# Patient Record
Sex: MALE | Race: WHITE | Employment: OTHER | URBAN - METROPOLITAN AREA
[De-identification: names, ages, dates, MRNs, and addresses within clinical notes are randomized per-mention and may not be internally consistent; named-entity substitution may affect disease eponyms.]

---

## 2023-09-11 ENCOUNTER — HOSPITAL ENCOUNTER (OUTPATIENT)
Facility: HOSPITAL | Age: 63
Setting detail: OBSERVATION
Discharge: HOME | DRG: 312 | End: 2023-09-12
Attending: EMERGENCY MEDICINE | Admitting: HOSPITALIST
Payer: COMMERCIAL

## 2023-09-11 ENCOUNTER — HOSPITAL ENCOUNTER (INPATIENT)
Facility: HOSPITAL | Age: 63
LOS: 1 days | Discharge: HOME | End: 2023-09-12
Attending: EMERGENCY MEDICINE | Admitting: HOSPITALIST
Payer: COMMERCIAL

## 2023-09-11 ENCOUNTER — APPOINTMENT (OUTPATIENT)
Dept: CT IMAGING | Facility: HOSPITAL | Age: 63
DRG: 312 | End: 2023-09-11
Attending: EMERGENCY MEDICINE
Payer: COMMERCIAL

## 2023-09-11 ENCOUNTER — APPOINTMENT (OUTPATIENT)
Dept: CT IMAGING | Facility: HOSPITAL | Age: 63
End: 2023-09-11
Attending: EMERGENCY MEDICINE
Payer: COMMERCIAL

## 2023-09-11 DIAGNOSIS — R41.82 ALTERED MENTAL STATUS, UNSPECIFIED ALTERED MENTAL STATUS TYPE: ICD-10-CM

## 2023-09-11 DIAGNOSIS — R55 SYNCOPE AND COLLAPSE: Primary | ICD-10-CM

## 2023-09-11 PROBLEM — E66.9 DIABETES MELLITUS TYPE 2 IN OBESE: Status: ACTIVE | Noted: 2023-09-11

## 2023-09-11 PROBLEM — I10 PRIMARY HYPERTENSION: Status: ACTIVE | Noted: 2023-09-11

## 2023-09-11 PROBLEM — R94.31 ABNORMAL EKG: Status: ACTIVE | Noted: 2023-09-11

## 2023-09-11 PROBLEM — E11.69 DIABETES MELLITUS TYPE 2 IN OBESE: Status: ACTIVE | Noted: 2023-09-11

## 2023-09-11 LAB
ALBUMIN SERPL-MCNC: 3.9 G/DL (ref 3.4–5)
ALBUMIN/GLOB SERPL: 1.2 {RATIO} (ref 1–2)
ALP LIVER SERPL-CCNC: 56 U/L
ALT SERPL-CCNC: 117 U/L
ANION GAP SERPL CALC-SCNC: 11 MMOL/L (ref 0–18)
AST SERPL-CCNC: 66 U/L (ref 15–37)
ATRIAL RATE: 80 BPM
BASOPHILS # BLD AUTO: 0.06 X10(3) UL (ref 0–0.2)
BASOPHILS NFR BLD AUTO: 0.8 %
BILIRUB SERPL-MCNC: 0.7 MG/DL (ref 0.1–2)
BUN BLD-MCNC: 19 MG/DL (ref 7–18)
CALCIUM BLD-MCNC: 9.5 MG/DL (ref 8.5–10.1)
CHLORIDE SERPL-SCNC: 105 MMOL/L (ref 98–112)
CO2 SERPL-SCNC: 20 MMOL/L (ref 21–32)
CREAT BLD-MCNC: 1.3 MG/DL
EGFRCR SERPLBLD CKD-EPI 2021: 62 ML/MIN/1.73M2 (ref 60–?)
EOSINOPHIL # BLD AUTO: 0.09 X10(3) UL (ref 0–0.7)
EOSINOPHIL NFR BLD AUTO: 1.1 %
ERYTHROCYTE [DISTWIDTH] IN BLOOD BY AUTOMATED COUNT: 13.8 %
GLOBULIN PLAS-MCNC: 3.3 G/DL (ref 2.8–4.4)
GLUCOSE BLD-MCNC: 125 MG/DL (ref 70–99)
GLUCOSE BLD-MCNC: 134 MG/DL (ref 70–99)
GLUCOSE BLD-MCNC: 143 MG/DL (ref 70–99)
GLUCOSE BLD-MCNC: 158 MG/DL (ref 70–99)
HCT VFR BLD AUTO: 45.1 %
HGB BLD-MCNC: 15.3 G/DL
IMM GRANULOCYTES # BLD AUTO: 0.07 X10(3) UL (ref 0–1)
IMM GRANULOCYTES NFR BLD: 0.9 %
LYMPHOCYTES # BLD AUTO: 2.22 X10(3) UL (ref 1–4)
LYMPHOCYTES NFR BLD AUTO: 27.8 %
MCH RBC QN AUTO: 28.8 PG (ref 26–34)
MCHC RBC AUTO-ENTMCNC: 33.9 G/DL (ref 31–37)
MCV RBC AUTO: 84.9 FL
MONOCYTES # BLD AUTO: 0.96 X10(3) UL (ref 0.1–1)
MONOCYTES NFR BLD AUTO: 12 %
NEUTROPHILS # BLD AUTO: 4.58 X10 (3) UL (ref 1.5–7.7)
NEUTROPHILS # BLD AUTO: 4.58 X10(3) UL (ref 1.5–7.7)
NEUTROPHILS NFR BLD AUTO: 57.4 %
OSMOLALITY SERPL CALC.SUM OF ELEC: 287 MOSM/KG (ref 275–295)
P AXIS: 32 DEGREES
P-R INTERVAL: 238 MS
PLATELET # BLD AUTO: 248 10(3)UL (ref 150–450)
POTASSIUM SERPL-SCNC: 3.4 MMOL/L (ref 3.5–5.1)
PROT SERPL-MCNC: 7.2 G/DL (ref 6.4–8.2)
Q-T INTERVAL: 434 MS
QRS DURATION: 148 MS
QTC CALCULATION (BEZET): 500 MS
R AXIS: -54 DEGREES
RBC # BLD AUTO: 5.31 X10(6)UL
SODIUM SERPL-SCNC: 136 MMOL/L (ref 136–145)
T AXIS: 104 DEGREES
TROPONIN I HIGH SENSITIVITY: 14 NG/L
VENTRICULAR RATE: 80 BPM
WBC # BLD AUTO: 8 X10(3) UL (ref 4–11)

## 2023-09-11 PROCEDURE — 70450 CT HEAD/BRAIN W/O DYE: CPT | Performed by: EMERGENCY MEDICINE

## 2023-09-11 PROCEDURE — 99223 1ST HOSP IP/OBS HIGH 75: CPT | Performed by: HOSPITALIST

## 2023-09-11 RX ORDER — PROCHLORPERAZINE EDISYLATE 5 MG/ML
5 INJECTION INTRAMUSCULAR; INTRAVENOUS EVERY 8 HOURS PRN
Status: DISCONTINUED | OUTPATIENT
Start: 2023-09-11 | End: 2023-09-12

## 2023-09-11 RX ORDER — ACYCLOVIR 400 MG/1
1 TABLET ORAL
COMMUNITY
Start: 2023-09-05

## 2023-09-11 RX ORDER — NEOMYCIN/POLYMYXIN B/PRAMOXINE 3.5-10K-1
1000 CREAM (GRAM) TOPICAL DAILY
COMMUNITY

## 2023-09-11 RX ORDER — POTASSIUM CHLORIDE 1.5 G/1.58G
40 POWDER, FOR SOLUTION ORAL EVERY 4 HOURS
Status: COMPLETED | OUTPATIENT
Start: 2023-09-11 | End: 2023-09-11

## 2023-09-11 RX ORDER — HYDROCODONE BITARTRATE AND ACETAMINOPHEN 5; 325 MG/1; MG/1
2 TABLET ORAL EVERY 4 HOURS PRN
Status: DISCONTINUED | OUTPATIENT
Start: 2023-09-11 | End: 2023-09-12

## 2023-09-11 RX ORDER — SODIUM CHLORIDE 9 MG/ML
INJECTION, SOLUTION INTRAVENOUS CONTINUOUS
Status: ACTIVE | OUTPATIENT
Start: 2023-09-11 | End: 2023-09-12

## 2023-09-11 RX ORDER — DEXTROSE MONOHYDRATE 25 G/50ML
50 INJECTION, SOLUTION INTRAVENOUS
Status: DISCONTINUED | OUTPATIENT
Start: 2023-09-11 | End: 2023-09-12

## 2023-09-11 RX ORDER — ACYCLOVIR 400 MG/1
1 TABLET ORAL AS DIRECTED
COMMUNITY
Start: 2023-09-06

## 2023-09-11 RX ORDER — POLYETHYLENE GLYCOL 3350 17 G/17G
17 POWDER, FOR SOLUTION ORAL DAILY PRN
Status: DISCONTINUED | OUTPATIENT
Start: 2023-09-11 | End: 2023-09-12

## 2023-09-11 RX ORDER — ONDANSETRON 2 MG/ML
4 INJECTION INTRAMUSCULAR; INTRAVENOUS EVERY 4 HOURS PRN
Status: DISCONTINUED | OUTPATIENT
Start: 2023-09-11 | End: 2023-09-11 | Stop reason: ALTCHOICE

## 2023-09-11 RX ORDER — ONDANSETRON 2 MG/ML
INJECTION INTRAMUSCULAR; INTRAVENOUS
Status: COMPLETED
Start: 2023-09-11 | End: 2023-09-11

## 2023-09-11 RX ORDER — LISINOPRIL 10 MG/1
10 TABLET ORAL DAILY
Status: DISCONTINUED | OUTPATIENT
Start: 2023-09-12 | End: 2023-09-12

## 2023-09-11 RX ORDER — ENEMA 19; 7 G/133ML; G/133ML
1 ENEMA RECTAL ONCE AS NEEDED
Status: DISCONTINUED | OUTPATIENT
Start: 2023-09-11 | End: 2023-09-12

## 2023-09-11 RX ORDER — LISINOPRIL 10 MG/1
1 TABLET ORAL DAILY
COMMUNITY
Start: 2020-10-08

## 2023-09-11 RX ORDER — OMEPRAZOLE 20 MG/1
20 CAPSULE, DELAYED RELEASE ORAL DAILY
COMMUNITY

## 2023-09-11 RX ORDER — MELATONIN
3 NIGHTLY PRN
Status: DISCONTINUED | OUTPATIENT
Start: 2023-09-11 | End: 2023-09-12

## 2023-09-11 RX ORDER — ROSUVASTATIN CALCIUM 10 MG/1
10 TABLET, COATED ORAL DAILY
Status: DISCONTINUED | OUTPATIENT
Start: 2023-09-12 | End: 2023-09-12

## 2023-09-11 RX ORDER — ENOXAPARIN SODIUM 100 MG/ML
40 INJECTION SUBCUTANEOUS DAILY
Status: DISCONTINUED | OUTPATIENT
Start: 2023-09-11 | End: 2023-09-12

## 2023-09-11 RX ORDER — HYDROCODONE BITARTRATE AND ACETAMINOPHEN 5; 325 MG/1; MG/1
1 TABLET ORAL EVERY 4 HOURS PRN
Status: DISCONTINUED | OUTPATIENT
Start: 2023-09-11 | End: 2023-09-12

## 2023-09-11 RX ORDER — ALPRAZOLAM 0.25 MG/1
0.25 TABLET ORAL 4 TIMES DAILY PRN
Status: DISCONTINUED | OUTPATIENT
Start: 2023-09-11 | End: 2023-09-12

## 2023-09-11 RX ORDER — ONDANSETRON 2 MG/ML
4 INJECTION INTRAMUSCULAR; INTRAVENOUS ONCE
Status: COMPLETED | OUTPATIENT
Start: 2023-09-11 | End: 2023-09-11

## 2023-09-11 RX ORDER — SENNOSIDES 8.6 MG
17.2 TABLET ORAL NIGHTLY PRN
Status: DISCONTINUED | OUTPATIENT
Start: 2023-09-11 | End: 2023-09-12

## 2023-09-11 RX ORDER — PANTOPRAZOLE SODIUM 20 MG/1
20 TABLET, DELAYED RELEASE ORAL
Status: DISCONTINUED | OUTPATIENT
Start: 2023-09-12 | End: 2023-09-12

## 2023-09-11 RX ORDER — ONDANSETRON 2 MG/ML
4 INJECTION INTRAMUSCULAR; INTRAVENOUS EVERY 6 HOURS PRN
Status: DISCONTINUED | OUTPATIENT
Start: 2023-09-11 | End: 2023-09-11

## 2023-09-11 RX ORDER — ACETAMINOPHEN 325 MG/1
650 TABLET ORAL EVERY 4 HOURS PRN
Status: DISCONTINUED | OUTPATIENT
Start: 2023-09-11 | End: 2023-09-12

## 2023-09-11 RX ORDER — NICOTINE POLACRILEX 4 MG
15 LOZENGE BUCCAL
Status: DISCONTINUED | OUTPATIENT
Start: 2023-09-11 | End: 2023-09-12

## 2023-09-11 RX ORDER — BISACODYL 10 MG
10 SUPPOSITORY, RECTAL RECTAL
Status: DISCONTINUED | OUTPATIENT
Start: 2023-09-11 | End: 2023-09-12

## 2023-09-11 RX ORDER — NICOTINE POLACRILEX 4 MG
30 LOZENGE BUCCAL
Status: DISCONTINUED | OUTPATIENT
Start: 2023-09-11 | End: 2023-09-12

## 2023-09-11 RX ORDER — ASPIRIN 81 MG/1
81 TABLET ORAL DAILY
Status: DISCONTINUED | OUTPATIENT
Start: 2023-09-11 | End: 2023-09-12

## 2023-09-11 NOTE — ED QUICK NOTES
Patients wife Will Tom called and provided an update on POC. All questions answered. Will Tom was informed he is going to be admitted for further evaluation. Will Tom asked if she can be updated with admission information. All question answered.

## 2023-09-11 NOTE — ED QUICK NOTES
Patient back from CT and placed on monitor. States the Zofran helped but he's still feeling lightheaded/dizzy. Vitals remain stable. Will continue to monitor.

## 2023-09-11 NOTE — ED QUICK NOTES
This RN and another RN responded to the room with low oxygen. Patient was dozing off. SpO2 improved to 96%. ED MD at bedside.

## 2023-09-11 NOTE — ED INITIAL ASSESSMENT (HPI)
Patient arrived to the ED from the 5th floor s/p syncopal episode. Hospital staff states patient was found collapsed outside of the elevators. Patient does not remember falling. Hospital staff placed on backboard and C-Collar. Patient is diabetic, states he ate today. Very diaphoretic and nauseous at this time. AAOx3 and denies pain. EKG completed.

## 2023-09-12 ENCOUNTER — APPOINTMENT (OUTPATIENT)
Dept: CV DIAGNOSTICS | Facility: HOSPITAL | Age: 63
DRG: 312 | End: 2023-09-12
Attending: HOSPITALIST
Payer: COMMERCIAL

## 2023-09-12 ENCOUNTER — APPOINTMENT (OUTPATIENT)
Dept: CV DIAGNOSTICS | Facility: HOSPITAL | Age: 63
End: 2023-09-12
Attending: HOSPITALIST
Payer: COMMERCIAL

## 2023-09-12 VITALS
RESPIRATION RATE: 22 BRPM | WEIGHT: 250 LBS | HEART RATE: 52 BPM | TEMPERATURE: 98 F | HEIGHT: 70 IN | BODY MASS INDEX: 35.79 KG/M2 | OXYGEN SATURATION: 98 % | DIASTOLIC BLOOD PRESSURE: 62 MMHG | SYSTOLIC BLOOD PRESSURE: 114 MMHG

## 2023-09-12 LAB
EST. AVERAGE GLUCOSE BLD GHB EST-MCNC: 255 MG/DL (ref 68–126)
GLUCOSE BLD-MCNC: 104 MG/DL (ref 70–99)
GLUCOSE BLD-MCNC: 116 MG/DL (ref 70–99)
GLUCOSE BLD-MCNC: 133 MG/DL (ref 70–99)
HBA1C MFR BLD: 10.5 % (ref ?–5.7)
POTASSIUM SERPL-SCNC: 4.4 MMOL/L (ref 3.5–5.1)

## 2023-09-12 PROCEDURE — 93306 TTE W/DOPPLER COMPLETE: CPT | Performed by: HOSPITALIST

## 2023-09-12 PROCEDURE — 99239 HOSP IP/OBS DSCHRG MGMT >30: CPT | Performed by: STUDENT IN AN ORGANIZED HEALTH CARE EDUCATION/TRAINING PROGRAM

## 2023-09-12 NOTE — PLAN OF CARE
Assumed care of pt around 1900. A/o x4. RA. NSR/SB w/ 1st deg AVB on tele. C/o lightheadedness/dizziness when standing/ambulating. Intermittent nausea. PRN compazine ordered. IVF started. Orthostatics (-). L side of head tender/swollen. PRN tylenol & Norco given. Plan for echo in the AM.  Spouse Ollie Peterson updated over the phone. Currently resting in bed w/ call light within reach & safety precautions in place.

## 2023-09-12 NOTE — PROGRESS NOTES
Orthostatics (-)   09/11/23 1951 09/11/23 1954 09/11/23 1955   Vital Signs   /62 138/67 138/73   MAP (mmHg) 80 85 89   BP Location Left arm Left arm Left arm   BP Method Automatic Automatic Automatic   Patient Position Lying Sitting Standing

## 2023-09-12 NOTE — PLAN OF CARE
Assumed care at 0700. Pt A/O x4. RA. NSR/SB on tele. VSS. Echo completed. All consults cleared for discharge. Pt family at the bedside. Discharge instructions reviewed with pt  And pt family. IV removed. Tele removed. Pt discharged home via wheel chair.

## 2023-09-15 ENCOUNTER — PATIENT OUTREACH (OUTPATIENT)
Dept: CASE MANAGEMENT | Age: 63
End: 2023-09-15

## 2023-09-15 NOTE — PROGRESS NOTES
Hospital follow up, first attempt. (Dc 9/12)    Follow up with [facility] for DM. No answer, left a voicemail with callback information.

## 2023-09-19 NOTE — PROGRESS NOTES
Hospital follow up, second attempt. (Dc 9/12)    Follow up with [facility] for DM. No answer, left a voicemail with callback information.

## 2023-09-20 NOTE — PROGRESS NOTES
Hospital follow up, final attempt. (Dc 9/12)    Follow up with [facility] for DM. No answer, left a voicemail with callback information. Closing encounter.

## 2024-05-04 ENCOUNTER — HOSPITAL ENCOUNTER (INPATIENT)
Facility: HOSPITAL | Age: 64
LOS: 3 days | Discharge: HOME HEALTH | DRG: 243 | End: 2024-05-07
Attending: EMERGENCY MEDICINE | Admitting: INTERNAL MEDICINE
Payer: COMMERCIAL

## 2024-05-04 ENCOUNTER — APPOINTMENT (OUTPATIENT)
Dept: CT IMAGING | Facility: HOSPITAL | Age: 64
DRG: 243 | End: 2024-05-04
Attending: EMERGENCY MEDICINE
Payer: COMMERCIAL

## 2024-05-04 ENCOUNTER — APPOINTMENT (OUTPATIENT)
Dept: CV DIAGNOSTICS | Facility: HOSPITAL | Age: 64
DRG: 243 | End: 2024-05-04
Attending: INTERNAL MEDICINE
Payer: COMMERCIAL

## 2024-05-04 ENCOUNTER — APPOINTMENT (OUTPATIENT)
Dept: GENERAL RADIOLOGY | Facility: HOSPITAL | Age: 64
DRG: 243 | End: 2024-05-04
Attending: EMERGENCY MEDICINE
Payer: COMMERCIAL

## 2024-05-04 DIAGNOSIS — R00.1 BRADYCARDIA: ICD-10-CM

## 2024-05-04 DIAGNOSIS — S32.10XA CLOSED FRACTURE OF SACRUM AND COCCYX, INITIAL ENCOUNTER (HCC): ICD-10-CM

## 2024-05-04 DIAGNOSIS — S09.90XA INJURY OF HEAD, INITIAL ENCOUNTER: ICD-10-CM

## 2024-05-04 DIAGNOSIS — R55 SYNCOPE, UNSPECIFIED SYNCOPE TYPE: Primary | ICD-10-CM

## 2024-05-04 DIAGNOSIS — R91.8 PULMONARY INFILTRATE: ICD-10-CM

## 2024-05-04 DIAGNOSIS — S32.2XXA CLOSED FRACTURE OF SACRUM AND COCCYX, INITIAL ENCOUNTER (HCC): ICD-10-CM

## 2024-05-04 PROBLEM — R73.9 HYPERGLYCEMIA: Status: ACTIVE | Noted: 2024-05-04

## 2024-05-04 LAB
ALBUMIN SERPL-MCNC: 3.6 G/DL (ref 3.4–5)
ALBUMIN/GLOB SERPL: 1.1 {RATIO} (ref 1–2)
ALP LIVER SERPL-CCNC: 56 U/L
ALT SERPL-CCNC: 60 U/L
ANION GAP SERPL CALC-SCNC: 6 MMOL/L (ref 0–18)
AST SERPL-CCNC: 19 U/L (ref 15–37)
ATRIAL RATE: 70 BPM
BASOPHILS # BLD AUTO: 0.04 X10(3) UL (ref 0–0.2)
BASOPHILS NFR BLD AUTO: 0.8 %
BILIRUB SERPL-MCNC: 0.9 MG/DL (ref 0.1–2)
BUN BLD-MCNC: 20 MG/DL (ref 9–23)
CALCIUM BLD-MCNC: 9.2 MG/DL (ref 8.5–10.1)
CHLORIDE SERPL-SCNC: 107 MMOL/L (ref 98–112)
CO2 SERPL-SCNC: 26 MMOL/L (ref 21–32)
CREAT BLD-MCNC: 1.09 MG/DL
EGFRCR SERPLBLD CKD-EPI 2021: 76 ML/MIN/1.73M2 (ref 60–?)
EOSINOPHIL # BLD AUTO: 0.08 X10(3) UL (ref 0–0.7)
EOSINOPHIL NFR BLD AUTO: 1.5 %
ERYTHROCYTE [DISTWIDTH] IN BLOOD BY AUTOMATED COUNT: 14 %
EST. AVERAGE GLUCOSE BLD GHB EST-MCNC: 154 MG/DL (ref 68–126)
FLUAV + FLUBV RNA SPEC NAA+PROBE: NEGATIVE
FLUAV + FLUBV RNA SPEC NAA+PROBE: NEGATIVE
GLOBULIN PLAS-MCNC: 3.3 G/DL (ref 2.8–4.4)
GLUCOSE BLD-MCNC: 103 MG/DL (ref 70–99)
GLUCOSE BLD-MCNC: 111 MG/DL (ref 70–99)
GLUCOSE BLD-MCNC: 156 MG/DL (ref 70–99)
GLUCOSE BLD-MCNC: 85 MG/DL (ref 70–99)
HBA1C MFR BLD: 7 % (ref ?–5.7)
HCT VFR BLD AUTO: 44 %
HGB BLD-MCNC: 14.8 G/DL
IMM GRANULOCYTES # BLD AUTO: 0.02 X10(3) UL (ref 0–1)
IMM GRANULOCYTES NFR BLD: 0.4 %
LYMPHOCYTES # BLD AUTO: 0.94 X10(3) UL (ref 1–4)
LYMPHOCYTES NFR BLD AUTO: 18.1 %
MCH RBC QN AUTO: 28.1 PG (ref 26–34)
MCHC RBC AUTO-ENTMCNC: 33.6 G/DL (ref 31–37)
MCV RBC AUTO: 83.7 FL
MONOCYTES # BLD AUTO: 0.57 X10(3) UL (ref 0.1–1)
MONOCYTES NFR BLD AUTO: 11 %
NEUTROPHILS # BLD AUTO: 3.53 X10 (3) UL (ref 1.5–7.7)
NEUTROPHILS # BLD AUTO: 3.53 X10(3) UL (ref 1.5–7.7)
NEUTROPHILS NFR BLD AUTO: 68.2 %
OSMOLALITY SERPL CALC.SUM OF ELEC: 294 MOSM/KG (ref 275–295)
P AXIS: 8 DEGREES
P-R INTERVAL: 248 MS
PLATELET # BLD AUTO: 205 10(3)UL (ref 150–450)
POTASSIUM SERPL-SCNC: 3.6 MMOL/L (ref 3.5–5.1)
POTASSIUM SERPL-SCNC: 4 MMOL/L (ref 3.5–5.1)
PROCALCITONIN SERPL-MCNC: <0.05 NG/ML (ref ?–0.16)
PROT SERPL-MCNC: 6.9 G/DL (ref 6.4–8.2)
Q-T INTERVAL: 434 MS
QRS DURATION: 148 MS
QTC CALCULATION (BEZET): 468 MS
R AXIS: -56 DEGREES
RBC # BLD AUTO: 5.26 X10(6)UL
RSV RNA SPEC NAA+PROBE: NEGATIVE
SARS-COV-2 RNA RESP QL NAA+PROBE: NOT DETECTED
SODIUM SERPL-SCNC: 139 MMOL/L (ref 136–145)
T AXIS: 96 DEGREES
TROPONIN I SERPL HS-MCNC: 10 NG/L
VENTRICULAR RATE: 70 BPM
WBC # BLD AUTO: 5.2 X10(3) UL (ref 4–11)

## 2024-05-04 PROCEDURE — 93306 TTE W/DOPPLER COMPLETE: CPT | Performed by: INTERNAL MEDICINE

## 2024-05-04 PROCEDURE — 72220 X-RAY EXAM SACRUM TAILBONE: CPT | Performed by: EMERGENCY MEDICINE

## 2024-05-04 PROCEDURE — 71045 X-RAY EXAM CHEST 1 VIEW: CPT | Performed by: EMERGENCY MEDICINE

## 2024-05-04 PROCEDURE — 70486 CT MAXILLOFACIAL W/O DYE: CPT | Performed by: EMERGENCY MEDICINE

## 2024-05-04 PROCEDURE — 72125 CT NECK SPINE W/O DYE: CPT | Performed by: EMERGENCY MEDICINE

## 2024-05-04 PROCEDURE — 70450 CT HEAD/BRAIN W/O DYE: CPT | Performed by: EMERGENCY MEDICINE

## 2024-05-04 PROCEDURE — 99223 1ST HOSP IP/OBS HIGH 75: CPT | Performed by: INTERNAL MEDICINE

## 2024-05-04 RX ORDER — NICOTINE POLACRILEX 4 MG
30 LOZENGE BUCCAL
Status: DISCONTINUED | OUTPATIENT
Start: 2024-05-04 | End: 2024-05-07

## 2024-05-04 RX ORDER — NICOTINE POLACRILEX 4 MG
15 LOZENGE BUCCAL
Status: DISCONTINUED | OUTPATIENT
Start: 2024-05-04 | End: 2024-05-07

## 2024-05-04 RX ORDER — LISINOPRIL 10 MG/1
10 TABLET ORAL DAILY
Status: DISCONTINUED | OUTPATIENT
Start: 2024-05-04 | End: 2024-05-07

## 2024-05-04 RX ORDER — ACETAMINOPHEN 500 MG
500 TABLET ORAL EVERY 4 HOURS PRN
Status: DISCONTINUED | OUTPATIENT
Start: 2024-05-04 | End: 2024-05-07

## 2024-05-04 RX ORDER — PANTOPRAZOLE SODIUM 20 MG/1
20 TABLET, DELAYED RELEASE ORAL
Status: DISCONTINUED | OUTPATIENT
Start: 2024-05-04 | End: 2024-05-07

## 2024-05-04 RX ORDER — HYDROCHLOROTHIAZIDE 12.5 MG/1
12.5 CAPSULE, GELATIN COATED ORAL DAILY
Status: DISCONTINUED | OUTPATIENT
Start: 2024-05-04 | End: 2024-05-04

## 2024-05-04 RX ORDER — NEOMYCIN/POLYMYXIN B/PRAMOXINE 3.5-10K-1
1000 CREAM (GRAM) TOPICAL DAILY
Status: DISCONTINUED | OUTPATIENT
Start: 2024-05-04 | End: 2024-05-04 | Stop reason: RX

## 2024-05-04 RX ORDER — PROCHLORPERAZINE EDISYLATE 5 MG/ML
5 INJECTION INTRAMUSCULAR; INTRAVENOUS EVERY 8 HOURS PRN
Status: DISCONTINUED | OUTPATIENT
Start: 2024-05-04 | End: 2024-05-07

## 2024-05-04 RX ORDER — HEPARIN SODIUM 5000 [USP'U]/ML
5000 INJECTION, SOLUTION INTRAVENOUS; SUBCUTANEOUS EVERY 8 HOURS SCHEDULED
Status: DISCONTINUED | OUTPATIENT
Start: 2024-05-04 | End: 2024-05-07

## 2024-05-04 RX ORDER — ASPIRIN 81 MG/1
81 TABLET ORAL DAILY
Status: DISCONTINUED | OUTPATIENT
Start: 2024-05-04 | End: 2024-05-07

## 2024-05-04 RX ORDER — ROSUVASTATIN CALCIUM 10 MG/1
10 TABLET, COATED ORAL NIGHTLY
Status: DISCONTINUED | OUTPATIENT
Start: 2024-05-04 | End: 2024-05-07

## 2024-05-04 RX ORDER — DEXTROSE MONOHYDRATE 25 G/50ML
50 INJECTION, SOLUTION INTRAVENOUS
Status: DISCONTINUED | OUTPATIENT
Start: 2024-05-04 | End: 2024-05-07

## 2024-05-04 RX ORDER — SODIUM CHLORIDE 9 MG/ML
INJECTION, SOLUTION INTRAVENOUS CONTINUOUS
Status: DISCONTINUED | OUTPATIENT
Start: 2024-05-04 | End: 2024-05-07

## 2024-05-04 RX ORDER — ONDANSETRON 2 MG/ML
4 INJECTION INTRAMUSCULAR; INTRAVENOUS EVERY 6 HOURS PRN
Status: DISCONTINUED | OUTPATIENT
Start: 2024-05-04 | End: 2024-05-06

## 2024-05-04 RX ORDER — POTASSIUM CHLORIDE 20 MEQ/1
40 TABLET, EXTENDED RELEASE ORAL EVERY 4 HOURS
Status: COMPLETED | OUTPATIENT
Start: 2024-05-04 | End: 2024-05-04

## 2024-05-04 NOTE — PLAN OF CARE
Admission note    Pt orientated to room. Call light in reach, menu in room. Tele monitor on, vital signs are taken, orthostatic blood pressure is negative. Head-to-toe complete . Skin check complete with PCT, is intact. Admission navigator complete, including PTA medications.      Patient is alert and oriented x4. On room air, no shortness of breath. Sinus rhythm with first degree, no complains of chest pain. Continent of bowel and bladder, had bowel movement on 5/3/2024. Bed in lower position, call light within reach. Plan of care updated, questions are answered.

## 2024-05-04 NOTE — PLAN OF CARE
Problem: CARDIOVASCULAR - ADULT  Goal: Maintains optimal cardiac output and hemodynamic stability  Description: INTERVENTIONS:  - Monitor vital signs, rhythm, and trends  - Monitor for bleeding, hypotension and signs of decreased cardiac output  - Evaluate effectiveness of vasoactive medications to optimize hemodynamic stability  - Monitor arterial and/or venous puncture sites for bleeding and/or hematoma  - Assess quality of pulses, skin color and temperature  - Assess for signs of decreased coronary artery perfusion - ex. Angina  - Evaluate fluid balance, assess for edema, trend weights  Outcome: Progressing  Goal: Absence of cardiac arrhythmias or at baseline  Description: INTERVENTIONS:  - Continuous cardiac monitoring, monitor vital signs, obtain 12 lead EKG if indicated  - Evaluate effectiveness of antiarrhythmic and heart rate control medications as ordered  - Initiate emergency measures for life threatening arrhythmias  - Monitor electrolytes and administer replacement therapy as ordered  Outcome: Progressing

## 2024-05-04 NOTE — ED PROVIDER NOTES
Patient Seen in: [facility] Emergency Department      History     Chief Complaint   Patient presents with    Dizziness     dizziness, fell twice in the last two days and hit head, thinks it's his BP meds which were just changed. pt drove to ER himself.     Stated Complaint: dizziness, fell twice in the last two days and hit head, thinks it's his BP med*    Subjective:   HPI    Presents with syncope and head injury.  The patient states that he drove here from [place] on Wednesday.  Thursday morning he had breakfast at the hotel and was at the carwash washing his car when he felt dizzy and then woke up on the ground.  He was unsure how long he was on the ground.  He had abrasions to the back of his head and pain in his tailbone.  He got himself up and left the car wash.  Yesterday it happened again while he was walking into a store.  He felt briefly dizzy and then again woke up on the ground looking at the adrienne.  Neither time he recalls feeling any abnormal sensation in his chest or chest pain.  He states he got a little sweaty but did not vomit.  He is now also having pain in his left TMJ area and feels like his teeth do not close correctly.  He states that he had a previous syncopal episode in September of last year.  This was attributed to starting Farxiga just prior and being dehydrated.  He is no longer on that medication.  I did review the notes from that visit and the patient had a normal echo.  He states he saw his doctor in April and his blood pressure was running a little high.  The doctor wrote to increase his lisinopril to 20 mg and he just recently started doing that because he finished his previous prescription.  He has not been checking his blood pressures at home.  States he wears a Dexcom and his blood sugars have been 120s to 130s.  He denies any tongue biting or incontinence.    Objective:   Past Medical History:    DVTs    following kidney stone extraction    Lymphoma in remission (HCC)     non hodgekins lymphoma    Metabolic syndrome    Other and unspecified hyperlipidemia    Unspecified essential hypertension              History reviewed. No pertinent surgical history.             Social History     Socioeconomic History    Marital status:    Tobacco Use    Smoking status: Never   Vaping Use    Vaping status: Never Used   Substance and Sexual Activity    Alcohol use: Yes    Drug use: Never     Social Determinants of Health     Food Insecurity: No Food Insecurity (8/28/2023)    [facility], [facility], [facility]    Hunger Vital Sign     Worried About Running Out of Food in the Last Year: Never true     Ran Out of Food in the Last Year: Never true   Transportation Needs: No Transportation Needs (8/28/2023)    Received from [facility], [facility], [facility]    PRAPARE - Transportation     Lack of Transportation (Medical): No     Lack of Transportation (Non-Medical): No              Review of Systems    Positive for stated complaint: dizziness, fell twice in the last two days and hit head, thinks it's his BP med*  Other systems are as noted in HPI.  Constitutional and vital signs reviewed.      All other systems reviewed and negative except as noted above.    Physical Exam     ED Triage Vitals [05/04/24 0602]   BP (!) 160/92   Pulse 73   Resp 20   Temp 97.9 °F (36.6 °C)   Temp src Temporal   SpO2 98 %   O2 Device None (Room air)       Current:/80   Pulse 66   Temp 97.9 °F (36.6 °C) (Temporal)   Resp 20   Ht 170.2 cm (5' 7\")   Wt 112.9 kg   SpO2 96%   BMI 39.00 kg/m²         Physical Exam    General: Alert and oriented x3 in no acute distress.  HEENT: Normocephalic, abrasions to the occipital region without associated hematoma, pupils equal round and reactive to light, oropharynx clear, uvula midline.  Neck: Mild tenderness in the midline mid C-spine.  Cardiovascular:  Regular rate and rhythm.  Respiratory: Lungs clear to auscultation.  Abdomen: Soft, nontender, no rebound or guarding, normal active bowel sounds, no CVA tenderness.  Extremities: No CCE.  Skin: Warm and dry.  Neurologic: Cranial nerves intact.  Strength 5/5 in all extremities.  Sensory exam grossly intact.    ED Course     Labs Reviewed   COMP METABOLIC PANEL (14) - Abnormal; Notable for the following components:       Result Value    Glucose 156 (*)     All other components within normal limits   CBC W/ DIFFERENTIAL - Abnormal; Notable for the following components:    Lymphocyte Absolute 0.94 (*)     All other components within normal limits   TROPONIN I HIGH SENSITIVITY - Normal   SARS-COV-2/FLU A AND B/RSV BY PCR (GENEXPERT) - Normal    Narrative:     This test is intended for the qualitative detection and differentiation of SARS-CoV-2, influenza A, influenza B, and respiratory syncytial virus (RSV) viral RNA in nasopharyngeal or nares swabs from individuals suspected of respiratory viral infection consistent with COVID-19 by their healthcare provider. Signs and symptoms of respiratory viral infection due to SARS-CoV-2, influenza, and RSV can be similar.    Test performed using the Xpert Xpress SARS-CoV-2/FLU/RSV (real time RT-PCR)  assay on the GeneXpert instrument, [employer], [place] [address].   This test is being used under the Food and Drug Administration's Emergency Use Authorization.    The authorized Fact Sheet for Healthcare Providers for this assay is available upon request from the laboratory.   CBC WITH DIFFERENTIAL WITH PLATELET    Narrative:     The following orders were created for panel order CBC With Differential With Platelet.  Procedure                               Abnormality         Status                     ---------                               -----------         ------                     CBC W/ DIFFERENTIAL[[id number]]          Abnormal            Final result                 Please  view results for these tests on the individual orders.   PROCALCITONIN   RAINBOW DRAW LAVENDER   RAINBOW DRAW LIGHT GREEN   RAINBOW DRAW GOLD   RAINBOW DRAW BLUE     EKG    Rate, intervals and axes as noted on EKG Report.  Rate: 70  Rhythm: Sinus rhythm with first-degree AV block  Reading: Left axis deviation, LVH with QRS widening and repolarization abnormality-appears similar to previous EKG         I personally reviewed the chest films and questionable right upper lobe infiltrate noted.        XR CHEST AP PORTABLE  (CPT=71045)    Result Date: 5/4/2024  PROCEDURE:  XR CHEST AP PORTABLE  (CPT=71045)  TECHNIQUE:  AP chest radiograph was obtained.  COMPARISON:  [facility] , XR, XR CHEST AP PORTABLE  (CPT=71010), 10/01/2014, 1:47 PM.  INDICATIONS:  dizziness, fell twice in the last two days and hit head, thinks it's his BP meds which were just changed. pt drove to ER himself.  PATIENT STATED HISTORY: (As transcribed by Technologist)  Patient has been having tailbone pain, he has fallen twice over the last two days.    FINDINGS:  There is vague patchy right upper lobe airspace opacity suspicious for an area of pneumonia.  Heart size is within normal limits.  No pleural effusion is seen.  Mediastinal and hilar contours are normal.  Follow-up is recommended to ensure resolution.  If there is persistent clinical concern then recommend CT.            CONCLUSION:  See above.   LOCATION:  [facility]      Dictated by (CST): Shon Gomes MD on 5/04/2024 at 8:19 AM     Finalized by (CST): Shon Gomes MD on 5/04/2024 at 8:20 AM       XR SACRUM + COCCYX (MIN 2 VIEWS) (CPT=72220)    Result Date: 5/4/2024  PROCEDURE:  XR SACRUM + COCCYX (MIN 2 VIEWS) (CPT=72220)  INDICATIONS:  pain, trauma  COMPARISON:  None.  PATIENT STATED HISTORY: (As transcribed by Technologist)  Patient has been having tailbone pain, he has fallen twice over the last two days.     FINDINGS:  Fecal material within the rectum somewhat limits visualization of the coccyx.   On the lateral view there is a question of a possible minimally displaced fracture involving the tip of the coccyx.  Clinical symptoms persist then consider follow-up imaging.            CONCLUSION:  See above.   LOCATION:  [facility]   Dictated by (CST): Shon Gomes MD on 5/04/2024 at 8:19 AM     Finalized by (CST): Shon Gomes MD on 5/04/2024 at 8:19 AM       CT FACIAL BONES (CPT=70486)    Result Date: 5/4/2024  PROCEDURE:  CT FACIAL BONES (CPT=70486)  COMPARISON:  None.  INDICATIONS:  dizziness, fell twice in the last two days and hit head, thinks it's his BP meds which were just changed. left jaw pain  TECHNIQUE:  Noncontrast CT scanning is performed through the facial bones. 3D shaded surface renderings are created on an independent CT scanner workstation. Dose reduction techniques were used. Dose information is transmitted to the ACR (American College of Radiology) NRDR (National Radiology Data Registry) which includes the Dose Index Registry.  3-D RENDERING:  Not applicable  PATIENT STATED HISTORY:(As transcribed by Technologist)  Frequent falls    FINDINGS:  No discrete facial bone fracture is identified.  Streak artifact from dental hardware somewhat limits assessment.  The visualized paranasal sinuses and mastoid airspaces appear clear.  Soft tissues of the neck have an unremarkable noncontrast appearance.            CONCLUSION:  See above.   LOCATION:  [facility]   Dictated by (CST): Shon Gomes MD on 5/04/2024 at 7:53 AM     Finalized by (CST): Shon Gomes MD on 5/04/2024 at 7:55 AM       CT BRAIN OR HEAD (13287)    Result Date: 5/4/2024  PROCEDURE:  CT BRAIN OR HEAD (78175)  COMPARISON:  [facility] , CT, CT BRAIN OR HEAD (38561), 9/11/2023, 1:22 PM.  INDICATIONS:  dizziness, fell twice in the last two days and hit head, thinks it's his BP meds which were just changed. pt drove to ER himself.  TECHNIQUE:  Noncontrast CT scanning is performed through the brain. Dose reduction techniques were used. Dose information is  transmitted to the ACR (American College of Radiology) NRDR (National Radiology Data Registry) which includes the Dose Index Registry.  PATIENT STATED HISTORY: (As transcribed by Technologist)  Frequent falls    FINDINGS:  VENTRICLES/SULCI:  Ventricles and sulci are normal in size.  INTRACRANIAL:  There are no abnormal extraaxial fluid collections.  There is no midline shift.  There are no intraparenchymal brain abnormalities.  There is nothing specific for acute infarct.  There is no hemorrhage or mass lesion.  SINUSES:           No sign of acute sinusitis.  MASTOIDS:          No sign of acute inflammation. SKULL:             No evidence for fracture or osseous abnormality. OTHER:             None.            CONCLUSION:  No significant midline shift or mass effect.  No acute intracranial hemorrhage.  If there is persistent clinical concern then consider MRI.     LOCATION:  [facility]   Dictated by (CST): Shon Gomes MD on 5/04/2024 at 7:50 AM     Finalized by (CST): Shon Gomes MD on 5/04/2024 at 7:53 AM       CT SPINE CERVICAL (CPT=72125)    Result Date: 5/4/2024  PROCEDURE:  CT SPINE CERVICAL (CPT=72125)  COMPARISON:  None.  INDICATIONS:  dizziness, fell twice in the last two days and hit head, thinks it's his BP meds which were just changed. neck pain  TECHNIQUE:  Noncontrast CT scanning of the cervical spine is performed from the skull base through C7.  Multiplanar reconstructions are generated.  Dose reduction techniques were used. Dose information is transmitted to the ACR (American College of Radiology) NRDR (National Radiology Data Registry) which includes the Dose Index Registry.  PATIENT STATED HISTORY: (As transcribed by Technologist)  Frequent falls    FINDINGS:  CRANIOCERVICAL AREA:  Normal foramen magnum with no Chiari malformation. PARASPINAL AREA:  Normal with no visible mass.  BONES:  No fracture, pars defect, or osseous lesion.  CERVICAL DISC LEVELS: C2-C3:  There is a right uncovertebral joint  osteophyte which causes mild right foraminal stenosis. C3-C4:  There are bilateral uncovertebral joint osteophytes.  There is moderate to severe left and mild right foraminal stenosis. C4-C5:  There is mild diffuse endplate osteophyte.  There are bilateral uncovertebral joint osteophytes.  There is no central canal stenosis.  There is mild bilateral foraminal stenosis. C5-C6:  There is diffuse endplate osteophyte.  There are bilateral uncovertebral joint osteophytes.  There is mild central canal and bilateral foraminal stenosis. C6-C7:  There are bilateral uncovertebral joint osteophytes.  There is no central canal stenosis.  There is mild left foraminal stenosis. C7-T1:  Evaluation at this level is limited by beam hardening artifact.  There is suggestion of diffuse disc bulge with mild central canal stenosis.            CONCLUSION:  1. There is no acute fracture or traumatic subluxation detected. 2. There is multilevel degenerative disc disease otherwise described in detail level by level above.    LOCATION:  [facility]   Dictated by (CST): Cedric Grijalva MD on 5/04/2024 at 7:47 AM     Finalized by (CST): Cedric Grijalva MD on 5/04/2024 at 7:51 AM        The patient was monitored on telemetry and twice during his ED observation period, he had episodes where he became bradycardic to the 30s.  He recovered on his own within seconds.  He did feel some dizziness during these times but did not have any syncopal episode.       MDM      Patient presents with syncopal episodes and head injury.  Differential diagnosis includes but is not limited to arrhythmia, intracranial hemorrhage, hypotension, dehydration and electrolyte abnormalities.  The patient did not have evidence of intracranial hemorrhage, cervical spine fracture or facial fracture on imaging.  He does have likely a coccyx fracture.  He has a questionable infiltrate on chest x-ray but does not have symptoms of pneumonia.  A procalcitonin has been sent but no  antibiotics ordered at this time.  The patient has had episodes of spontaneous bradycardia that I think could likely be related to his syncopal episodes.  He is not on a medication that should slow his heart rate.  His labs are reassuring including his electrolytes and cardiac enzymes.  I discussed his case with Kay Carias from [facility] and cardiology will see the patient.  I also discussed the case with Dr. Peralta from the hospitalist service who has agreed to admit the patient primarily.  Patient is in agreement with the plan.  Admission disposition: 5/4/2024  8:39 AM                                Medical Decision Making      Disposition and Plan     Clinical Impression:  1. Syncope, unspecified syncope type    2. Bradycardia    3. Injury of head, initial encounter    4. Closed fracture of sacrum and coccyx, initial encounter ([facility])    5. Pulmonary infiltrate         Disposition:  Admit  5/4/2024  8:39 am    Follow-up:  No follow-up provider specified.        Medications Prescribed:  Current Discharge Medication List                            Hospital Problems       Present on Admission             ICD-10-CM Noted POA    * (Principal) Syncope, unspecified syncope type R55 5/4/2024 Unknown    Hyperglycemia R73.9 5/4/2024 Yes

## 2024-05-04 NOTE — ED QUICK NOTES
Orders for admission, patient is aware of plan and ready to go upstairs. Any questions, please call ED ELENI Agustin at extension [phone].     Patient Covid vaccination status: Unvaccinated     COVID Test Ordered in ED: SARS-CoV-2/Flu A and B/RSV by PCR (GeneXpert)    COVID Suspicion at Admission: N/A    Running Infusions:  None    Mental Status/LOC at time of transport: AOx4    Other pertinent information:   CIWA score: N/A   NIH score:  N/A        
Pt requesting to update wife, guero, via telephone call.  
This PCT noted and printed a pause in patient's cardiac rhythm - patient's heart rate reached 39 and then went back to 59.     MD notified.   
DISPLAY PLAN FREE TEXT

## 2024-05-04 NOTE — H&P
[facility]     Aries Da Silva Patient Status:  Inpatient    1960 MRN [id number]   Location [facility] 8NE-A Attending Chidi Peralta MD   Hosp Day # 0 PCP PHYSICIAN NONSTAFF     Chief Complaint: syncope    Subjective:    History of Present Illness:     Aries Da Silva is a 64 year old male with PMhx of lyphoma, HTN, DL presents with passing out and dizziness. Pt states he has had 2 episodes over the past 2 days when he felt dizzy and fell. He states his BP meds were recently adjusted. He denies any CP or SOB. No F/C. No cough. He denies any focal weakness, numbness or tingling. He denies any tongue biting or loss or urine/bowel function. He denies any history of seizures in the past. He otherwise denies any N/V/D/C or abd pain. In ED he was noted to have a few times when his HR did drop to the 30s.     History/Other:    Past Medical History:  Past Medical History:    DVTs    following kidney stone extraction    Lymphoma in remission (HCC)    non hodgekins lymphoma    Metabolic syndrome    Other and unspecified hyperlipidemia    Unspecified essential hypertension     Past Surgical History:   History reviewed. No pertinent surgical history.   Family History:   History reviewed. No pertinent family history.  Social History:    reports that he has never smoked. He does not have any smokeless tobacco history on file. He reports current alcohol use. He reports that he does not use drugs.     Allergies:   Allergies   Allergen Reactions    Ciprofloxacin NAUSEA AND VOMITING    Scallops NAUSEA AND VOMITING       Medications:    No current facility-administered medications on file prior to encounter.     Current Outpatient Medications on File Prior to Encounter   Medication Sig Dispense Refill    lisinopril 10 MG Oral Tab Take 1 tablet (10 mg total) by mouth daily.      dapagliflozin 5 MG Oral Tab Take 1 tablet (5 mg total) by mouth every morning.      Continuous Blood Gluc   (DEXCOM G7 ) Does not apply Device Take 1 Bottle by mouth As Directed.      Continuous Blood Gluc Sensor (DEXCOM G7 SENSOR) Does not apply Misc 1 Device Every 10 days.      Multiple Vitamin (MULTI-VITAMIN) Oral Tab Take 1 tablet by mouth daily.      Omega-3 Fatty Acids (FISH OIL PEARLS) 150 MG Oral Cap Take 1,000 mg by mouth daily.      omeprazole 20 MG Oral Capsule Delayed Release Take 1 capsule (20 mg total) by mouth daily.      MetFORMIN HCl (GLUCOPHAGE) 500 MG Oral Tab Take 1 tablet (500 mg total) by mouth 2 (two) times daily with meals.      Rosuvastatin Calcium (CRESTOR) 10 MG Oral Tab Take 1 tablet (10 mg total) by mouth nightly.      aspirin 81 MG Oral Tab Take 1 tablet (81 mg total) by mouth daily.      hydrochlorothiazide (MICROZIDE) 12.5 MG Oral Cap Take 1 capsule (12.5 mg total) by mouth daily.         Review of Systems:   A comprehensive review of systems was completed.    Pertinent positives and negatives noted in the HPI.    Objective:   Physical Exam:    /68   Pulse 57   Temp 97.9 °F (36.6 °C) (Temporal)   Resp 16   Ht 170.2 cm (5' 7\")   Wt 249 lb (112.9 kg)   SpO2 98%   BMI 39.00 kg/m²   General: No acute distress, Alert  Respiratory: No rhonchi, no wheezes  Cardiovascular: S1, S2. Regular rate and rhythm  Abdomen: Soft, Non-tender, non-distended, positive bowel sounds  Neuro: No new focal deficits  Extremities: No edema      Results:    Labs:      Labs Last 24 Hours:    Recent Labs   Lab 05/04/24  0618   RBC 5.26   HGB 14.8   HCT 44.0   MCV 83.7   MCH 28.1   MCHC 33.6   RDW 14.0   NEPRELIM 3.53   WBC 5.2   .0       Recent Labs   Lab 05/04/24  0618   *   BUN 20   CREATSERUM 1.09   EGFRCR 76   CA 9.2   ALB 3.6      K 3.6      CO2 26.0   ALKPHO 56   AST 19   ALT 60   BILT 0.9   TP 6.9       No results found for: \"PT\", \"INR\"    Recent Labs   Lab 05/04/24  0618   TROPHS 10       No results for input(s): \"TROP\", \"PBNP\" in the last 168  hours.    Recent Labs   Lab 05/04/24  0618   PCT <0.05       Imaging: Imaging data reviewed in Epic.    Assessment & Plan:      #Syncope  #Bradycardia  Monitor on tele  Cardiology to eval  Check ECHO  Not on any BB     #Possible PNA  No current symptoms  Check PCT, if elevated consider abx    #Ess HTN  Check orthostatics    #Dyslipidemia  Statin    #Lypmhoma in remission    #Coccyx fracture  Pain control        Plan of care discussed with patient, ED Physician     Chidi Peralta MD    Supplementary Documentation:     The 21st Century Cures Act makes medical notes like these available to patients in the interest of transparency. Please be advised this is a medical document. Medical documents are intended to carry relevant information, facts as evident, and the clinical opinion of the practitioner. The medical note is intended as peer to peer communication and may appear blunt or direct. It is written in medical language and may contain abbreviations or verbiage that are unfamiliar.

## 2024-05-04 NOTE — PROGRESS NOTES
05/04/24 1030 05/04/24 1032 05/04/24 1034   Vital Signs   Temp 97.7 °F (36.5 °C)  --   --    Temp src Oral  --   --    Pulse 61 63 62   Heart Rate Source Monitor Monitor Monitor   Resp 18 18 20   Respiratory Quality Normal Normal Normal   /81 140/81 142/77   MAP (mmHg) 99 98 97   BP Location Left arm Left arm Left arm   BP Method Automatic Automatic Automatic   Patient Position Lying Sitting Standing   Oxygen Therapy   SpO2 97 % 96 % 95 %   O2 Device None (Room air) None (Room air) None (Room air)   Pulse Oximetry Type Continuous Continuous Continuous   Oximetry Probe Site Changed No No No   Pulse Ox Probe Location Left hand Left hand Left hand   Height and Weight   Weight  --   --  118.1 kg (260 lb 5.8 oz)   Scale Type  --   --  Standing scale     Orthostatic blood pressure was negative

## 2024-05-04 NOTE — CONSULTS
Cardiology Consultation      Aries Da Silva Patient Status:  Inpatient    1960 MRN [id number]   Location [facility] 8NE-A Attending Chidi Peralta MD   Hosp Day # 0 PCP PHYSICIAN NONSTAFF     Reason for Consultation:  Syncope     History of Present Illness:  Aries Da Silva is a(n) 64 year old male with chronic medical conditions including htn, hld, hx of lymphoma who presents with episodes of passing out. This has happened twice in the last two days. First episode happened Thursday when he was washing his car and happened out of the blue - has 5-10 seconds of dizziness prior to episode. Same episode happened yesterday when he was walking in from his car to go inside [employer] and passed out before reaching the door. No chest pain, palpitations, nausea, emesis, diaphoresis. Notes complete loss of consciousness. Denies being dehydrated. Denies prior cardiac hx. Notes having hx of lymphoma for which he has had radiation to the left lower abdomen/groin region and R-CHOP therapy. Cardiology asked to eval bc in the ER, he was noted to have bradycardia into the 30's and symptoms.     History:  Past Medical History:    DVTs    following kidney stone extraction    Lymphoma in remission (HCC)    non hodgekins lymphoma    Metabolic syndrome    Other and unspecified hyperlipidemia    Unspecified essential hypertension     History reviewed. No pertinent surgical history.  History reviewed. No pertinent family history.   reports that he has never smoked. He does not have any smokeless tobacco history on file. He reports current alcohol use. He reports that he does not use drugs.    Allergies:  Allergies   Allergen Reactions    Ciprofloxacin NAUSEA AND VOMITING    Scallops NAUSEA AND VOMITING       Medications:    Current Facility-Administered Medications:     aspirin DR tab 81 mg, 81 mg, Oral, Daily    lisinopril (Zestril) tab 10 mg, 10 mg, Oral, Daily    pantoprazole (Protonix) DR tab 20 mg, 20  mg, Oral, QAM AC    rosuvastatin (Crestor) tab 10 mg, 10 mg, Oral, Nightly    glucose (Dex4) 15 GM/59ML oral liquid 15 g, 15 g, Oral, Q15 Min PRN **OR** glucose (Glutose) 40% oral gel 15 g, 15 g, Oral, Q15 Min PRN **OR** glucose-vitamin C (Dex-4) chewable tab 4 tablet, 4 tablet, Oral, Q15 Min PRN **OR** dextrose 50% injection 50 mL, 50 mL, Intravenous, Q15 Min PRN **OR** glucose (Dex4) 15 GM/59ML oral liquid 30 g, 30 g, Oral, Q15 Min PRN **OR** glucose (Glutose) 40% oral gel 30 g, 30 g, Oral, Q15 Min PRN **OR** glucose-vitamin C (Dex-4) chewable tab 8 tablet, 8 tablet, Oral, Q15 Min PRN    heparin (Porcine) 5000 UNIT/ML injection 5,000 Units, 5,000 Units, Subcutaneous, Q8H KRIS    sodium chloride 0.9% infusion, , Intravenous, Continuous    acetaminophen (Tylenol Extra Strength) tab 500 mg, 500 mg, Oral, Q4H PRN    ondansetron (Zofran) 4 MG/2ML injection 4 mg, 4 mg, Intravenous, Q6H PRN    prochlorperazine (Compazine) 10 MG/2ML injection 5 mg, 5 mg, Intravenous, Q8H PRN    insulin aspart (NovoLOG) 100 Units/mL FlexPen 1-10 Units, 1-10 Units, Subcutaneous, TID AC and HS    potassium chloride (Klor-Con M20) tab 40 mEq, 40 mEq, Oral, Q4H    Review of Systems:  A comprehensive review of systems was negative if not otherwise mention in above HPI.    /86 (BP Location: Left arm)   Pulse 59   Temp 97.7 °F (36.5 °C) (Oral)   Resp 20   Ht 5' 7\" (1.702 m)   Wt 260 lb 5.8 oz (118.1 kg)   SpO2 96%   BMI 40.78 kg/m²   Temp (24hrs), Av.8 °F (36.6 °C), Min:97.7 °F (36.5 °C), Max:97.9 °F (36.6 °C)     No intake or output data in the 24 hours ending 24 1223  Wt Readings from Last 3 Encounters:   24 260 lb 5.8 oz (118.1 kg)   23 250 lb (113.4 kg)   10/01/14 250 lb (113.4 kg)       Physical Exam:   General: Alert and oriented x 3. No apparent distress. No respiratory or constitutional distress. Obese.   HEENT: Normocephalic, anicteric sclera, neck supple.  Neck: No JVD  Cardiac: Regular rate and rhythm.  S1, S2 normal. No murmur, pericardial rub, S3.  Lungs: Clear without wheezes, rales, rhonchi or dullness.  Normal excursions and effort.  Abdomen: Soft, non-tender. BS-present.  Extremities: Without clubbing, cyanosis or edema.   Neurologic: Alert and oriented, normal affect.  Skin: Warm and dry.     Laboratory Data:  Lab Results   Component Value Date    WBC 5.2 05/04/2024    HGB 14.8 05/04/2024    HCT 44.0 05/04/2024    .0 05/04/2024    CREATSERUM 1.09 05/04/2024    BUN 20 05/04/2024     05/04/2024    K 3.6 05/04/2024     05/04/2024    CO2 26.0 05/04/2024     05/04/2024    CA 9.2 05/04/2024    ALB 3.6 05/04/2024    ALKPHO 56 05/04/2024    BILT 0.9 05/04/2024    TP 6.9 05/04/2024    AST 19 05/04/2024    ALT 60 05/04/2024    PGLU 103 05/04/2024       Imaging/results:  EKG - sinus with 1st degree av block, rbbb, lafb   High sensitivity troponin -WNLx1      Assessment:  True syncope w/ fall leading to coccyx fracture   Symptomatic sinus bradycardia w/ 1st degree AV block, RBBB, LAFB   HTN  HLD  Hx of lymphoma s/p localized radiation and R-CHOP therapy     Plan:  ECHO  EP eval inpatient  - likely would benefit from pacemaker if no alternate etiology is identified   Tele monitoring   Check orthostatic bp  Further recs to follow         Thank you for allowing me to participate in the care of your patient.      Zeferino Mari DO  Cardiologist  [facility] [place]  5/4/2024 12:23 PM      Note to the patient: The 21st Century Cures Act makes medical notes like these available to patients in the interest of transparency. However, be advised that this is a medical document. It is intended as peer to peer communication. It is written in medical language and may contain abbreviations or verbiage that are unfamiliar. It may appear blunt or direct. Medical documents are intended to carry relevant information, facts as evident, and clinical opinion of the practitioner.     Disclaimer: Components  of this note were documented using voice recognition system and are subject to errors not corrected at proofreading. Contact the author of this note for any clarifications.

## 2024-05-05 LAB
ANION GAP SERPL CALC-SCNC: 5 MMOL/L (ref 0–18)
BASOPHILS # BLD AUTO: 0.04 X10(3) UL (ref 0–0.2)
BASOPHILS NFR BLD AUTO: 0.9 %
BUN BLD-MCNC: 14 MG/DL (ref 9–23)
CALCIUM BLD-MCNC: 8.5 MG/DL (ref 8.5–10.1)
CHLORIDE SERPL-SCNC: 110 MMOL/L (ref 98–112)
CO2 SERPL-SCNC: 24 MMOL/L (ref 21–32)
CREAT BLD-MCNC: 0.89 MG/DL
EGFRCR SERPLBLD CKD-EPI 2021: 96 ML/MIN/1.73M2 (ref 60–?)
EOSINOPHIL # BLD AUTO: 0.06 X10(3) UL (ref 0–0.7)
EOSINOPHIL NFR BLD AUTO: 1.4 %
ERYTHROCYTE [DISTWIDTH] IN BLOOD BY AUTOMATED COUNT: 14.2 %
GLUCOSE BLD-MCNC: 105 MG/DL (ref 70–99)
GLUCOSE BLD-MCNC: 108 MG/DL (ref 70–99)
GLUCOSE BLD-MCNC: 120 MG/DL (ref 70–99)
GLUCOSE BLD-MCNC: 126 MG/DL (ref 70–99)
GLUCOSE BLD-MCNC: 97 MG/DL (ref 70–99)
HCT VFR BLD AUTO: 42.5 %
HGB BLD-MCNC: 14.4 G/DL
IMM GRANULOCYTES # BLD AUTO: 0.03 X10(3) UL (ref 0–1)
IMM GRANULOCYTES NFR BLD: 0.7 %
LYMPHOCYTES # BLD AUTO: 0.84 X10(3) UL (ref 1–4)
LYMPHOCYTES NFR BLD AUTO: 19.3 %
MAGNESIUM SERPL-MCNC: 1.9 MG/DL (ref 1.6–2.6)
MCH RBC QN AUTO: 28.5 PG (ref 26–34)
MCHC RBC AUTO-ENTMCNC: 33.9 G/DL (ref 31–37)
MCV RBC AUTO: 84.2 FL
MONOCYTES # BLD AUTO: 0.48 X10(3) UL (ref 0.1–1)
MONOCYTES NFR BLD AUTO: 11 %
NEUTROPHILS # BLD AUTO: 2.91 X10 (3) UL (ref 1.5–7.7)
NEUTROPHILS # BLD AUTO: 2.91 X10(3) UL (ref 1.5–7.7)
NEUTROPHILS NFR BLD AUTO: 66.7 %
OSMOLALITY SERPL CALC.SUM OF ELEC: 290 MOSM/KG (ref 275–295)
PLATELET # BLD AUTO: 172 10(3)UL (ref 150–450)
POTASSIUM SERPL-SCNC: 4.2 MMOL/L (ref 3.5–5.1)
RBC # BLD AUTO: 5.05 X10(6)UL
SODIUM SERPL-SCNC: 139 MMOL/L (ref 136–145)
WBC # BLD AUTO: 4.4 X10(3) UL (ref 4–11)

## 2024-05-05 PROCEDURE — 99232 SBSQ HOSP IP/OBS MODERATE 35: CPT | Performed by: INTERNAL MEDICINE

## 2024-05-05 NOTE — PLAN OF CARE
Assumed pt care at 1930. A&O x4. Tele rhythm NSR/ SB w/ first degree heart block.  SPO2 95% on room air. Breath sounds clear bilaterally. Pt voiding with no issues. No c/o chest pain or shortness of breath. Abrasions from syncopal episode on back of head and left elbow. Bed is locked and in low position. Call light and personal items within reach. Reviewed plan of care and patient verbalizes understanding.  POC: NS @ 75 ml/hour, EP to eval  Problem: CARDIOVASCULAR - ADULT  Goal: Maintains optimal cardiac output and hemodynamic stability  Description: INTERVENTIONS:  - Monitor vital signs, rhythm, and trends  - Monitor for bleeding, hypotension and signs of decreased cardiac output  - Evaluate effectiveness of vasoactive medications to optimize hemodynamic stability  - Monitor arterial and/or venous puncture sites for bleeding and/or hematoma  - Assess quality of pulses, skin color and temperature  - Assess for signs of decreased coronary artery perfusion - ex. Angina  - Evaluate fluid balance, assess for edema, trend weights  Outcome: Progressing  Goal: Absence of cardiac arrhythmias or at baseline  Description: INTERVENTIONS:  - Continuous cardiac monitoring, monitor vital signs, obtain 12 lead EKG if indicated  - Evaluate effectiveness of antiarrhythmic and heart rate control medications as ordered  - Initiate emergency measures for life threatening arrhythmias  - Monitor electrolytes and administer replacement therapy as ordered  Outcome: Progressing     Problem: PAIN - ADULT  Goal: Verbalizes/displays adequate comfort level or patient's stated pain goal  Description: INTERVENTIONS:  - Encourage pt to monitor pain and request assistance  - Assess pain using appropriate pain scale  - Administer analgesics based on type and severity of pain and evaluate response  - Implement non-pharmacological measures as appropriate and evaluate response  - Consider cultural and social influences on pain and pain management  -  Manage/alleviate anxiety  - Utilize distraction and/or relaxation techniques  - Monitor for opioid side effects  - Notify MD/LIP if interventions unsuccessful or patient reports new pain  - Anticipate increased pain with activity and pre-medicate as appropriate  Outcome: Progressing     Problem: SAFETY ADULT - FALL  Goal: Free from fall injury  Description: INTERVENTIONS:  - Assess pt frequently for physical needs  - Identify cognitive and physical deficits and behaviors that affect risk of falls.  - [place] fall precautions as indicated by assessment.  - Educate pt/family on patient safety including physical limitations  - Instruct pt to call for assistance with activity based on assessment  - Modify environment to reduce risk of injury  - Provide assistive devices as appropriate  - Consider OT/PT consult to assist with strengthening/mobility  - Encourage toileting schedule  Outcome: Progressing     Problem: DISCHARGE PLANNING  Goal: Discharge to home or other facility with appropriate resources  Description: INTERVENTIONS:  - Identify barriers to discharge w/pt and caregiver  - Include patient/family/discharge partner in discharge planning  - Arrange for needed discharge resources and transportation as appropriate  - Identify discharge learning needs (meds, wound care, etc)  - Arrange for interpreters to assist at discharge as needed  - Consider post-discharge preferences of patient/family/discharge partner  - Complete POLST form as appropriate  - Assess patient's ability to be responsible for managing their own health  - Refer to Case Management Department for coordinating discharge planning if the patient needs post-hospital services based on physician/LIP order or complex needs related to functional status, cognitive ability or social support system  Outcome: Progressing

## 2024-05-05 NOTE — PROGRESS NOTES
[facility]   part of [facility]     Hospitalist Progress Note     Aries Da Silva Patient Status:  Inpatient    1960 MRN [id number]   Location [facility] 8NE-A Attending Chidi Peralta MD   Hosp Day # 1 PCP PHYSICIAN NONSTAFF     Chief Complaint: syncope    Subjective:     Patient without acute events overnight. Had an episode of dizziness. No CP or SOB.     Objective:    Review of Systems:   A comprehensive review of systems was completed; pertinent positive and negatives stated in subjective.    Vital signs:  Temp:  [97.7 °F (36.5 °C)-98.2 °F (36.8 °C)] 97.9 °F (36.6 °C)  Pulse:  [47-69] 69  Resp:  [15-20] 20  BP: (107-144)/(56-86) 144/72  SpO2:  [95 %-98 %] 98 %    Physical Exam:    General: No acute distress  Respiratory: No wheezes, no rhonchi  Cardiovascular: S1, S2, regular rate and rhythm  Abdomen: Soft, Non-tender, non-distended, positive bowel sounds  Neuro: No new focal deficits.   Extremities: No edema      Diagnostic Data:    Labs:  Recent Labs   Lab 24  0546   WBC 5.2 4.4   HGB 14.8 14.4   MCV 83.7 84.2   .0 172.0       Recent Labs   Lab 24  0546   *  --  120*   BUN 20  --  14   CREATSERUM 1.09  --  0.89   CA 9.2  --  8.5   ALB 3.6  --   --      --  139   K 3.6 4.0 4.2     --  110   CO2 26.0  --  24.0   ALKPHO 56  --   --    AST 19  --   --    ALT 60  --   --    BILT 0.9  --   --    TP 6.9  --   --        Estimated Creatinine Clearance: 79.4 mL/min (based on SCr of 0.89 mg/dL).    Recent Labs   Lab 24  0618   TROPHS 10       No results for input(s): \"PTP\", \"INR\" in the last 168 hours.               Microbiology    No results found for this visit on 24.      Imaging: Reviewed in Epic.    Medications:    aspirin  81 mg Oral Daily    lisinopril  10 mg Oral Daily    pantoprazole  20 mg Oral QAM AC    rosuvastatin  10 mg Oral Nightly    heparin  5,000 Units Subcutaneous Q8H KRIS     insulin aspart  1-10 Units Subcutaneous TID AC and HS       Assessment & Plan:      #Syncope  #Bradycardia  Monitor on tele  Cardiology to eval  ECHO reviewed  EP to eval  NPO at MN  Not on any BB      #Possible PNA  No current symptoms  Check PCT, negative     #Ess HTN  Continue home meds     #Dyslipidemia  Statin     #Lypmhoma in remission     #Coccyx fracture  Pain control      Chidi Peralta MD    Supplementary Documentation:     Quality:  DVT Mechanical Prophylaxis:   SCDs,    DVT Pharmacologic Prophylaxis   Medication    heparin (Porcine) 5000 UNIT/ML injection 5,000 Units         DVT Pharmacologic prophylaxis: Aspirin 81 mg      Code Status: Not on file  Lockhart: No urinary catheter in place  Lockhart Duration (in days):   Central line:    RAMANDEEP:     Discharge is dependent on: progress  At this point Mr. Da Silva is expected to be discharge to: home    The 21st Century Cures Act makes medical notes like these available to patients in the interest of transparency. Please be advised this is a medical document. Medical documents are intended to carry relevant information, facts as evident, and the clinical opinion of the practitioner. The medical note is intended as peer to peer communication and may appear blunt or direct. It is written in medical language and may contain abbreviations or verbiage that are unfamiliar.

## 2024-05-05 NOTE — PROGRESS NOTES
Progress Note  Aries Da Silva Patient Status:  Inpatient    1960 MRN [id number]   Location [facility] 8NE-A Attending Chidi Peralta MD   Hosp Day # 1 PCP PHYSICIAN NONSTAFF     Subjective:  In chair. C/o episode of dizziness this am while eating between 740-750am.    Objective:  /56 (BP Location: Left arm)   Pulse (!) 47   Temp 97.7 °F (36.5 °C) (Oral)   Resp 16   Ht 5' 7\" (1.702 m)   Wt 260 lb 5.8 oz (118.1 kg)   SpO2 98%   BMI 40.78 kg/m²     Telemetry: nsr, sb      Intake/Output:    Intake/Output Summary (Last 24 hours) at 2024 0755  Last data filed at 2024 1810  Gross per 24 hour   Intake --   Output 600 ml   Net -600 ml       Last 3 Weights   24 1047 260 lb 5.8 oz (118.1 kg)   24 1034 260 lb 5.8 oz (118.1 kg)   24 0602 249 lb (112.9 kg)   23 1719 250 lb (113.4 kg)   23 1222 250 lb (113.4 kg)   10/01/14 1305 250 lb (113.4 kg)       Labs:  Recent Labs   Lab 24  0546   *  --  120*   BUN 20  --  14   CREATSERUM 1.09  --  0.89   EGFRCR 76  --  96   CA 9.2  --  8.5     --  139   K 3.6 4.0 4.2     --  110   CO2 26.0  --  24.0     Recent Labs   Lab 24  0618 24  0546   RBC 5.26 5.05   HGB 14.8 14.4   HCT 44.0 42.5   MCV 83.7 84.2   MCH 28.1 28.5   MCHC 33.6 33.9   RDW 14.0 14.2   NEPRELIM 3.53 2.91   WBC 5.2 4.4   .0 172.0         Recent Labs   Lab 24  0618   TROPHS 10       Review of Systems   Cardiovascular: Negative.    Respiratory: Negative.         Physical Exam:    Gen: alert, oriented x 3, NAD  Heent: pupils equal, reactive. Mucous membranes moist.   Neck: no jvd  Cardiac: regular rate and rhythm, normal S1,S2  Lungs: CTA  Abd: soft, NT/ND +bs  Ext: no edema  Skin: Warm, dry  Neuro: No focal deficits        Medications:     aspirin  81 mg Oral Daily    lisinopril  10 mg Oral Daily    pantoprazole  20 mg Oral QAM AC    rosuvastatin  10 mg Oral Nightly    heparin   5,000 Units Subcutaneous Q8H [facility]    insulin aspart  1-10 Units Subcutaneous TID AC and HS      sodium chloride 75 mL/hr at 05/05/24 0236           Assessment:  True syncope with fall leading to coccyx fracture  Trop negative on admit  Orthostatics negative.  Symptomatic sinus bradycardia with 1st degree avb, rbbb, lafb  Echo 5/4/24-LVEF 60-65%, no rwmas.   Nuclear stress 10/2023-no perfusion defects  HTN-controlled  On acei  HL-statin  Hx lymphoma s/p localized radiation and R CHOP therapy  DMII-A1c 7.0    Plan:  Labs stable  Echo with preserved LVEF  No tele events to correlate with dizziness this am. Episodes of nsr/sb overnight. No high degree avb noted.  Will ask EP to evaluate tomorrow.   NPO after midnight. Hold subcu heparin in am.    Plan of care discussed with patient, wife by phone, RN.    Kay Carias, ALEJANDRA  5/5/2024  7:55 AM  [phone]  [phone]      Patient seen and examined independently.  Note reviewed and labs reviewed. Agree with above assessment and plan.  64-year-old male who presented with multiple episodes of true syncope.  Suspect that there may be some component of bradycardia which may be involved.  EKG notes first-degree AV block, right bundle branch block, left anterior fascicular block.  While in the emergency room, he was noted to have bradycardia with a heart rate in the 30s associated with symptoms.  He is not on any AV darren blocking agents.  Will benefit from EP evaluation tomorrow morning and possible PPM.  Echo was completed noting preserved LV systolic function.  N.p.o. after midnight.  Will continue to follow.        Zeferino Mari DO  Cardiologist  [facility]  5/5/2024 11:17 AM      Note to the patient: The 21st Century Cures Act makes medical notes like these available to patients in the interest of transparency. However, be advised that this is a medical document. It is intended as peer to peer communication. It is written in medical  language and may contain abbreviations or verbiage that are unfamiliar. It may appear blunt or direct. Medical documents are intended to carry relevant information, facts as evident, and clinical opinion of the practitioner.     Disclaimer: Components of this note were documented using voice recognition system and are subject to errors not corrected at proofreading. Contact the author of this note for any clarifications.

## 2024-05-05 NOTE — PLAN OF CARE
Assumed patient care, patient is alert and oriented x4. On room air. Sinus rhythm on tele, denies of chest pain. Abdomen is soft, non-tendered, bowel sounds are active in all four quadrants. Bed in lower position, call light within reach.     Problem: CARDIOVASCULAR - ADULT  Goal: Maintains optimal cardiac output and hemodynamic stability  Description: INTERVENTIONS:  - Monitor vital signs, rhythm, and trends  - Monitor for bleeding, hypotension and signs of decreased cardiac output  - Evaluate effectiveness of vasoactive medications to optimize hemodynamic stability  - Monitor arterial and/or venous puncture sites for bleeding and/or hematoma  - Assess quality of pulses, skin color and temperature  - Assess for signs of decreased coronary artery perfusion - ex. Angina  - Evaluate fluid balance, assess for edema, trend weights  Outcome: Progressing  Goal: Absence of cardiac arrhythmias or at baseline  Description: INTERVENTIONS:  - Continuous cardiac monitoring, monitor vital signs, obtain 12 lead EKG if indicated  - Evaluate effectiveness of antiarrhythmic and heart rate control medications as ordered  - Initiate emergency measures for life threatening arrhythmias  - Monitor electrolytes and administer replacement therapy as ordered  Outcome: Progressing

## 2024-05-06 ENCOUNTER — APPOINTMENT (OUTPATIENT)
Dept: INTERVENTIONAL RADIOLOGY/VASCULAR | Facility: HOSPITAL | Age: 64
DRG: 243 | End: 2024-05-06
Attending: INTERNAL MEDICINE
Payer: COMMERCIAL

## 2024-05-06 ENCOUNTER — APPOINTMENT (OUTPATIENT)
Dept: GENERAL RADIOLOGY | Facility: HOSPITAL | Age: 64
DRG: 243 | End: 2024-05-06
Attending: INTERNAL MEDICINE
Payer: COMMERCIAL

## 2024-05-06 LAB
GLUCOSE BLD-MCNC: 116 MG/DL (ref 70–99)
GLUCOSE BLD-MCNC: 92 MG/DL (ref 70–99)
GLUCOSE BLD-MCNC: 93 MG/DL (ref 70–99)
GLUCOSE BLD-MCNC: 98 MG/DL (ref 70–99)

## 2024-05-06 PROCEDURE — 71046 X-RAY EXAM CHEST 2 VIEWS: CPT | Performed by: INTERNAL MEDICINE

## 2024-05-06 PROCEDURE — 0JH606Z INSERTION OF PACEMAKER, DUAL CHAMBER INTO CHEST SUBCUTANEOUS TISSUE AND FASCIA, OPEN APPROACH: ICD-10-PCS | Performed by: INTERNAL MEDICINE

## 2024-05-06 PROCEDURE — B51N1ZZ FLUOROSCOPY OF LEFT UPPER EXTREMITY VEINS USING LOW OSMOLAR CONTRAST: ICD-10-PCS | Performed by: INTERNAL MEDICINE

## 2024-05-06 PROCEDURE — 02HK3JZ INSERTION OF PACEMAKER LEAD INTO RIGHT VENTRICLE, PERCUTANEOUS APPROACH: ICD-10-PCS | Performed by: INTERNAL MEDICINE

## 2024-05-06 PROCEDURE — 99232 SBSQ HOSP IP/OBS MODERATE 35: CPT | Performed by: INTERNAL MEDICINE

## 2024-05-06 PROCEDURE — 02H63JZ INSERTION OF PACEMAKER LEAD INTO RIGHT ATRIUM, PERCUTANEOUS APPROACH: ICD-10-PCS | Performed by: INTERNAL MEDICINE

## 2024-05-06 RX ORDER — MIDAZOLAM HYDROCHLORIDE 1 MG/ML
INJECTION INTRAMUSCULAR; INTRAVENOUS
Status: DISCONTINUED
Start: 2024-05-06 | End: 2024-05-06 | Stop reason: WASHOUT

## 2024-05-06 RX ORDER — VANCOMYCIN HYDROCHLORIDE 1 G/20ML
INJECTION, POWDER, LYOPHILIZED, FOR SOLUTION INTRAVENOUS
Status: COMPLETED
Start: 2024-05-06 | End: 2024-05-06

## 2024-05-06 RX ORDER — POLYETHYLENE GLYCOL 3350 17 G/17G
17 POWDER, FOR SOLUTION ORAL DAILY
Status: DISCONTINUED | OUTPATIENT
Start: 2024-05-06 | End: 2024-05-07

## 2024-05-06 RX ORDER — CEFAZOLIN SODIUM/WATER 2 G/20 ML
2 SYRINGE (ML) INTRAVENOUS EVERY 8 HOURS
Qty: 40 ML | Refills: 0 | Status: COMPLETED | OUTPATIENT
Start: 2024-05-07 | End: 2024-05-07

## 2024-05-06 RX ORDER — LIDOCAINE HYDROCHLORIDE 10 MG/ML
INJECTION, SOLUTION EPIDURAL; INFILTRATION; INTRACAUDAL; PERINEURAL
Status: COMPLETED
Start: 2024-05-06 | End: 2024-05-06

## 2024-05-06 RX ORDER — MIDAZOLAM HYDROCHLORIDE 1 MG/ML
INJECTION INTRAMUSCULAR; INTRAVENOUS
Status: COMPLETED
Start: 2024-05-06 | End: 2024-05-06

## 2024-05-06 RX ORDER — CEFAZOLIN SODIUM/WATER 2 G/20 ML
SYRINGE (ML) INTRAVENOUS
Status: COMPLETED
Start: 2024-05-06 | End: 2024-05-07

## 2024-05-06 RX ORDER — ONDANSETRON 2 MG/ML
4 INJECTION INTRAMUSCULAR; INTRAVENOUS EVERY 6 HOURS PRN
Status: DISCONTINUED | OUTPATIENT
Start: 2024-05-06 | End: 2024-05-07

## 2024-05-06 NOTE — PROGRESS NOTES
Progress Note  Aries Da Silva Patient Status:  Inpatient    1960 MRN [id number]   Location [facility] 8NE-A Attending Chidi Peralta MD   Hosp Day # 2 PCP PHYSICIAN NONSTAFF     Subjective:  Up in chair. Feeling ok     Objective:  /79 (BP Location: Right arm)   Pulse 63   Temp 97.5 °F (36.4 °C) (Oral)   Resp 20   Ht 5' 7\" (1.702 m)   Wt 260 lb 5.8 oz (118.1 kg)   SpO2 98%   BMI 40.78 kg/m²     Telemetry: nsr, sb      Intake/Output:    Intake/Output Summary (Last 24 hours) at 2024 1000  Last data filed at 2024 0700  Gross per 24 hour   Intake 2787.5 ml   Output 1600 ml   Net 1187.5 ml       Last 3 Weights   24 1047 260 lb 5.8 oz (118.1 kg)   24 1034 260 lb 5.8 oz (118.1 kg)   24 0602 249 lb (112.9 kg)   23 1719 250 lb (113.4 kg)   23 1222 250 lb (113.4 kg)   10/01/14 1305 250 lb (113.4 kg)       Labs:  Recent Labs   Lab 24  0546   *  --  120*   BUN 20  --  14   CREATSERUM 1.09  --  0.89   EGFRCR 76  --  96   CA 9.2  --  8.5     --  139   K 3.6 4.0 4.2     --  110   CO2 26.0  --  24.0     Recent Labs   Lab 2418 24  0546   RBC 5.26 5.05   HGB 14.8 14.4   HCT 44.0 42.5   MCV 83.7 84.2   MCH 28.1 28.5   MCHC 33.6 33.9   RDW 14.0 14.2   NEPRELIM 3.53 2.91   WBC 5.2 4.4   .0 172.0         Recent Labs   Lab 24  0618   TROPHS 10       Review of Systems   Cardiovascular: Negative.    Respiratory: Negative.         Physical Exam:    Gen: alert, oriented x 3, NAD  Heent: pupils equal, reactive. Mucous membranes moist.   Neck: no jvd  Cardiac: regular rate and rhythm, normal S1,S2  Lungs: CTA  Abd: soft, NT/ND +bs  Ext: no edema  Skin: Warm, dry  Neuro: No focal deficits        Medications:     polyethylene glycol (PEG 3350)  17 g Oral Daily    aspirin  81 mg Oral Daily    lisinopril  10 mg Oral Daily    pantoprazole  20 mg Oral QAM AC    rosuvastatin  10 mg Oral Nightly     heparin  5,000 Units Subcutaneous Q8H KRIS    insulin aspart  1-10 Units Subcutaneous TID AC and HS      sodium chloride 75 mL/hr at 05/06/24 0530           Assessment:  True syncope with fall leading to coccyx fracture  Trop negative on admit  Orthostatics negative.  Symptomatic sinus bradycardia with 1st degree avb, rbbb, lafb  Echo 5/4/24-LVEF 60-65%, no rwmas.   Nuclear stress 10/2023-no perfusion defects  HTN-controlled  On acei  HL-statin  Hx lymphoma s/p localized radiation and R CHOP therapy  DMII-A1c 7.0    Plan:  Tele with NSR/sb, no evidence of high degree avb.   Plan for PPM this afternoon. Place IV in left arm.     Plan of care discussed with patient, wife by phone, RN.    Kay Carias, ALEJANDRA  5/6/2024  10:00 AM  [phone]  [facility] [phone]      ____________________________  Pt seen and examined and discussed with the APN.    Tele: NSR    Exam  General- awake alert  HEENT- PEERLA  Neck- JVP normal  CV- RRR  Lungs- CTA  Extremities- no edema  Neuro- Normal  Psych- mood/affect congruent  Skin- no lesions    I have personally performed the medical decision making in its entirety. My additions include:  Plan  - Discussed recurrent syncope with trifascicular block makes blaine a higher risk than vasovagal. Did discuss that we cannot guarantee that it wasn't vagal, but with his conduction system disease and the abrupt nature of his syncope, it would be better to proceed with pacing.    Normal EF. Defer Conduction system pacing given extent of his conduction disease    Prior ports. Will need Arm IV on the left, and preferably the right as well      R3    ____________________________  Aurelio Hunt MD, FACC, RS  Cardiac Electrophysiololgy  [facility]  5/6/2024

## 2024-05-06 NOTE — PAYOR COMM NOTE
--------------  ADMISSION REVIEW   5/4-5/6  Payor: [facility] OUT OF STATE HMO  Subscriber #:  [id number]  Authorization Number: [id number]    Admit date: 5/4/24  Admit time: 10:12 AM       REVIEW DOCUMENTATION:  ED Provider Notes signed by Judy Turk MD at 5/4/2024  9:11 AM    Patient Seen in: [facility] Emergency Department  History     Chief Complaint   Patient presents with    Dizziness     dizziness, fell twice in the last two days and hit head, thinks it's his BP meds which were just changed. pt drove to ER himself.     Stated Complaint: dizziness, fell twice in the last two days and hit head, thinks it's his BP med*    Subjective:   Presents with syncope and head injury.  The patient states that he drove here from [place] on Wednesday.  Thursday morning he had breakfast at the hotel and was at the carwash washing his car when he felt dizzy and then woke up on the ground.  He was unsure how long he was on the ground.  He had abrasions to the back of his head and pain in his tailbone.  He got himself up and left the car wash.  Yesterday it happened again while he was walking into a store.  He felt briefly dizzy and then again woke up on the ground looking at the adrienne.  Neither time he recalls feeling any abnormal sensation in his chest or chest pain.  He states he got a little sweaty but did not vomit.  He is now also having pain in his left TMJ area and feels like his teeth do not close correctly.  He states that he had a previous syncopal episode in September of last year.  This was attributed to starting Farxiga just prior and being dehydrated.  He is no longer on that medication.  I did review the notes from that visit and the patient had a normal echo.  He states he saw his doctor in April and his blood pressure was running a little high.  The doctor wrote to increase his lisinopril to 20 mg and he just recently started doing that because he finished his previous prescription.  He has not been  checking his blood pressures at home.  States he wears a Dexcom and his blood sugars have been 120s to 130s.  He denies any tongue biting or incontinence.    Objective:   Past Medical History:    DVTs    following kidney stone extraction    Lymphoma in remission (HCC)    non hodgekins lymphoma    Metabolic syndrome    Other and unspecified hyperlipidemia    Unspecified essential hypertension   Positive for stated complaint: dizziness, fell twice in the last two days and hit head, thinks it's his BP med*  Other systems are as noted in HPI.  Constitutional and vital signs reviewed.      All other systems reviewed and negative except as noted above.    Physical Exam     ED Triage Vitals [05/04/24 0602]   BP (!) 160/92   Pulse 73   Resp 20   Temp 97.9 °F (36.6 °C)   Temp src Temporal   SpO2 98 %   O2 Device None (Room air)       Current:/80   Pulse 66   Temp 97.9 °F (36.6 °C) (Temporal)   Resp 20   Ht 170.2 cm (5' 7\")   Wt 112.9 kg   SpO2 96%   BMI 39.00 kg/m²     General: Alert and oriented x3 in no acute distress.  HEENT: Normocephalic, abrasions to the occipital region without associated hematoma, pupils equal round and reactive to light, oropharynx clear, uvula midline.  Neck: Mild tenderness in the midline mid C-spine.  Cardiovascular: Regular rate and rhythm.  Respiratory: Lungs clear to auscultation.  Abdomen: Soft, nontender, no rebound or guarding, normal active bowel sounds, no CVA tenderness.  Extremities: No CCE.  Skin: Warm and dry.  Neurologic: Cranial nerves intact.  Strength 5/5 in all extremities.  Sensory exam grossly intact.    ED Course     Labs Reviewed   COMP METABOLIC PANEL (14) - Abnormal; Notable for the following components:       Result Value    Glucose 156 (*)     All other components within normal limits   CBC W/ DIFFERENTIAL - Abnormal; Notable for the following components:    Lymphocyte Absolute 0.94 (*)     All other components within normal limits   TROPONIN I HIGH  SENSITIVITY - Normal   SARS-COV-2/FLU A AND B/RSV BY PCR (GENEXPERT) - Normal    Narrative:     This test is intended for the qualitative detection and differentiation of SARS-CoV-2, influenza A, influenza B, and respiratory syncytial virus (RSV) viral RNA in nasopharyngeal or nares swabs from individuals suspected of respiratory viral infection consistent with COVID-19 by their healthcare provider. Signs and symptoms of respiratory viral infection due to SARS-CoV-2, influenza, and RSV can be similar.    Test performed using the Xpert Xpress SARS-CoV-2/FLU/RSV (real time RT-PCR)  assay on the GeneXpert instrument, [employer], [address] [place] [address].   This test is being used under the Food and Drug Administration's Emergency Use Authorization.    The authorized Fact Sheet for Healthcare Providers for this assay is available upon request from the laboratory.   CBC WITH DIFFERENTIAL WITH PLATELET    Narrative:     The following orders were created for panel order CBC With Differential With Platelet.  Procedure                               Abnormality         Status                     ---------                               -----------         ------                     CBC W/ DIFFERENTIAL[[id number]]          Abnormal            Final result                 Please view results for these tests on the individual orders.   PROCALCITONIN   RAINBOW DRAW LAVENDER   RAINBOW DRAW LIGHT GREEN   RAINBOW DRAW GOLD   RAINBOW DRAW BLUE     EKG    Rate, intervals and axes as noted on EKG Report.  Rate: 70  Rhythm: Sinus rhythm with first-degree AV block  Reading: Left axis deviation, LVH with QRS widening and repolarization abnormality-appears similar to previous EKG     I personally reviewed the chest films and questionable right upper lobe infiltrate noted.  XR CHEST AP PORTABLE  (CPT=71045)    Result Date: 5/4/2024  PROCEDURE:  XR CHEST AP PORTABLE  (CPT=71045)  TECHNIQUE:  AP chest radiograph was obtained.  COMPARISON:  [facility] , XR,  XR CHEST AP PORTABLE  (CPT=71010), 10/01/2014, 1:47 PM.  INDICATIONS:  dizziness, fell twice in the last two days and hit head, thinks it's his BP meds which were just changed. pt drove to ER himself.  PATIENT STATED HISTORY: (As transcribed by Technologist)  Patient has been having tailbone pain, he has fallen twice over the last two days.    FINDINGS:  There is vague patchy right upper lobe airspace opacity suspicious for an area of pneumonia.  Heart size is within normal limits.  No pleural effusion is seen.  Mediastinal and hilar contours are normal.  Follow-up is recommended to ensure resolution.  If there is persistent clinical concern then recommend CT.            CONCLUSION:  See above.   LOCATION:  [facility]      Dictated by (CST): Shon Gomes MD on 5/04/2024 at 8:19 AM     Finalized by (CST): Shon Gomes MD on 5/04/2024 at 8:20 AM       XR SACRUM + COCCYX (MIN 2 VIEWS) (CPT=72220)    Result Date: 5/4/2024  PROCEDURE:  XR SACRUM + COCCYX (MIN 2 VIEWS) (CPT=72220)  INDICATIONS:  pain, trauma  COMPARISON:  None.  PATIENT STATED HISTORY: (As transcribed by Technologist)  Patient has been having tailbone pain, he has fallen twice over the last two days.     FINDINGS:  Fecal material within the rectum somewhat limits visualization of the coccyx.  On the lateral view there is a question of a possible minimally displaced fracture involving the tip of the coccyx.  Clinical symptoms persist then consider follow-up imaging.            CONCLUSION:  See above.   LOCATION:  [facility]   Dictated by (CST): Shon Gomes MD on 5/04/2024 at 8:19 AM     Finalized by (CST): Shon Gomes MD on 5/04/2024 at 8:19 AM       CT FACIAL BONES (CPT=70486)    Result Date: 5/4/2024  PROCEDURE:  CT FACIAL BONES (CPT=70486)  COMPARISON:  None.  INDICATIONS:  dizziness, fell twice in the last two days and hit head, thinks it's his BP meds which were just changed. left jaw pain  TECHNIQUE:  Noncontrast CT scanning is performed through the facial bones.  3D shaded surface renderings are created on an independent CT scanner workstation. Dose reduction techniques were used. Dose information is transmitted to the ACR (American College of Radiology) NRDR (National Radiology Data Registry) which includes the Dose Index Registry.  3-D RENDERING:  Not applicable  PATIENT STATED HISTORY:(As transcribed by Technologist)  Frequent falls    FINDINGS:  No discrete facial bone fracture is identified.  Streak artifact from dental hardware somewhat limits assessment.  The visualized paranasal sinuses and mastoid airspaces appear clear.  Soft tissues of the neck have an unremarkable noncontrast appearance.            CONCLUSION:  See above.   LOCATION:  [facility]   Dictated by (CST): Shon Gomes MD on 5/04/2024 at 7:53 AM     Finalized by (CST): Shon Gomes MD on 5/04/2024 at 7:55 AM       CT BRAIN OR HEAD (15279)    Result Date: 5/4/2024  CONCLUSION:  No significant midline shift or mass effect.  No acute intracranial hemorrhage.  If there is persistent clinical concern then consider MRI.     LOCATION:  [facility]   Dictated by (CST): Shon Gomes MD on 5/04/2024 at 7:50 AM     Finalized by (CST): Shon Gomes MD on 5/04/2024 at 7:53 AM       CT SPINE CERVICAL (CPT=72125)    Result Date: 5/4/2024    CONCLUSION:  1. There is no acute fracture or traumatic subluxation detected. 2. There is multilevel degenerative disc disease otherwise described in detail level by level above.    LOCATION:  [facility]   Dictated by (CST): Cedric Grijalva MD on 5/04/2024 at 7:47 AM     Finalized by (CST): Cedric Grijalva MD on 5/04/2024 at 7:51 AM        The patient was monitored on telemetry and twice during his ED observation period, he had episodes where he became bradycardic to the 30s.  He recovered on his own within seconds.  He did feel some dizziness during these times but did not have any syncopal episode.      MDM      Patient presents with syncopal episodes and head injury.  Differential diagnosis includes but  is not limited to arrhythmia, intracranial hemorrhage, hypotension, dehydration and electrolyte abnormalities.  The patient did not have evidence of intracranial hemorrhage, cervical spine fracture or facial fracture on imaging.  He does have likely a coccyx fracture.  He has a questionable infiltrate on chest x-ray but does not have symptoms of pneumonia.  A procalcitonin has been sent but no antibiotics ordered at this time.  The patient has had episodes of spontaneous bradycardia that I think could likely be related to his syncopal episodes.  He is not on a medication that should slow his heart rate.  His labs are reassuring including his electrolytes and cardiac enzymes.  I discussed his case with Kay Carias from [facility] and cardiology will see the patient.  I also discussed the case with Dr. Peralta from the hospitalist service who has agreed to admit the patient primarily.  Patient is in agreement with the plan.    Admission disposition: 5/4/2024  8:39 AM  Disposition and Plan     Clinical Impression:  1. Syncope, unspecified syncope type    2. Bradycardia    3. Injury of head, initial encounter    4. Closed fracture of sacrum and coccyx, initial encounter ([facility])    5. Pulmonary infiltrate         Disposition:  Admit  5/4/2024  8:39 am  Hospital Problems       Present on Admission             ICD-10-CM Noted POA    * (Principal) Syncope, unspecified syncope type R55 5/4/2024 Unknown    Hyperglycemia R73.9 5/4/2024 Yes                 5/4 H&P  Chief Complaint: syncope        Aries Da Silva is a 64 year old male with PMhx of lyphoma, HTN, DL presents with passing out and dizziness. Pt states he has had 2 episodes over the past 2 days when he felt dizzy and fell. He states his BP meds were recently adjusted. He denies any CP or SOB. No F/C. No cough. He denies any focal weakness, numbness or tingling. He denies any tongue biting or loss or urine/bowel function. He denies any history of seizures in the  past. He otherwise denies any N/V/D/C or abd pain. In ED he was noted to have a few times when his HR did drop to the 30s.     Assessment & Plan:  #Syncope  #Bradycardia  Monitor on tele  Cardiology to eval  Check ECHO  Not on any BB      #Possible PNA  No current symptoms  Check PCT, if elevated consider abx     #Ess HTN  Check orthostatics     #Dyslipidemia  Statin     #Lypmhoma in remission     #Coccyx fracture  Pain control          5/4 Cardiology  Reason for Consultation:  Syncope      History of Present Illness:  Aries Da Silva is a(n) 64 year old male with chronic medical conditions including htn, hld, hx of lymphoma who presents with episodes of passing out. This has happened twice in the last two days. First episode happened Thursday when he was washing his car and happened out of the blue - has 5-10 seconds of dizziness prior to episode. Same episode happened yesterday when he was walking in from his car to go inside [employer] and passed out before reaching the door. No chest pain, palpitations, nausea, emesis, diaphoresis. Notes complete loss of consciousness. Denies being dehydrated. Denies prior cardiac hx. Notes having hx of lymphoma for which he has had radiation to the left lower abdomen/groin region and R-CHOP therapy. Cardiology asked to eval bc in the ER, he was noted to have bradycardia into the 30's and symptoms.   Assessment:  True syncope w/ fall leading to coccyx fracture   Symptomatic sinus bradycardia w/ 1st degree AV block, RBBB, LAFB   HTN  HLD  Hx of lymphoma s/p localized radiation and R-CHOP therapy      Plan:  ECHO  EP eval inpatient  - likely would benefit from pacemaker if no alternate etiology is identified   Tele monitoring   Check orthostatic bp  Further recs to follow         5/5 Cardiology  C/o episode of dizziness this am while eating between 740-750am.     Objective:  /56 (BP Location: Left arm)   Pulse (!) 47   Temp 97.7 °F (36.5 °C) (Oral)   Resp 16    Ht 5' 7\" (1.702 m)   Wt 260 lb 5.8 oz (118.1 kg)   SpO2 98%   BMI 40.78 kg/m²      Telemetry: nsr, sb     sodium chloride 75 mL/hr at 05/05/24 0236                  Assessment:  True syncope with fall leading to coccyx fracture  Trop negative on admit  Orthostatics negative.  Symptomatic sinus bradycardia with 1st degree avb, rbbb, lafb  Echo 5/4/24-LVEF 60-65%, no rwmas.   Nuclear stress 10/2023-no perfusion defects  HTN-controlled  On acei  HL-statin  Hx lymphoma s/p localized radiation and R CHOP therapy  DMII-A1c 7.0     Plan:  Labs stable  Echo with preserved LVEF  No tele events to correlate with dizziness this am. Episodes of nsr/sb overnight. No high degree avb noted.  Will ask EP to evaluate tomorrow.   NPO after midnight. Hold subcu heparin in am.     Patient seen and examined independently.  Note reviewed and labs reviewed. Agree with above assessment and plan.  64-year-old male who presented with multiple episodes of true syncope.  Suspect that there may be some component of bradycardia which may be involved.  EKG notes first-degree AV block, right bundle branch block, left anterior fascicular block.  While in the emergency room, he was noted to have bradycardia with a heart rate in the 30s associated with symptoms.  He is not on any AV darren blocking agents.  Will benefit from EP evaluation tomorrow morning and possible PPM.  Echo was completed noting preserved LV systolic function.  N.p.o. after midnight.  Will continue to follow.          5/5  Had an episode of dizziness     Vital signs:  Temp:  [97.7 °F (36.5 °C)-98.2 °F (36.8 °C)] 97.9 °F (36.6 °C)  Pulse:  [47-69] 69  Resp:  [15-20] 20  BP: (107-144)/(56-86) 144/72  SpO2:  [95 %-98 %] 98 %     Physical Exam:    General: No acute distress  Respiratory: No wheezes, no rhonchi  Cardiovascular: S1, S2, regular rate and rhythm  Abdomen: Soft, Non-tender, non-distended, positive bowel sounds  Neuro: No new focal deficits.   Extremities: No edema         Diagnostic Data:    Labs:       Recent Labs   Lab 05/04/24 0618 05/05/24  0546   WBC 5.2 4.4   HGB 14.8 14.4   MCV 83.7 84.2   .0 172.0               Recent Labs   Lab 05/04/24 0618 05/04/24 1951 05/05/24  0546   *  --  120*   BUN 20  --  14   CREATSERUM 1.09  --  0.89   CA 9.2  --  8.5   ALB 3.6  --   --      --  139   K 3.6 4.0 4.2     --  110   CO2 26.0  --  24.0   ALKPHO 56  --   --    AST 19  --   --    ALT 60  --   --    BILT 0.9  --   --    TP 6.9  --   --      Assessment & Plan:  #Syncope  #Bradycardia  Monitor on tele  Cardiology to eval  ECHO reviewed  EP to eval  NPO at MN  Not on any BB      #Possible PNA  No current symptoms  Check PCT, negative     #Ess HTN  Continue home meds     #Dyslipidemia  Statin     #Lypmhoma in remission     #Coccyx fracture  Pain control        5/6  Awaiting EP     Vital signs:  Temp:  [97.5 °F (36.4 °C)-98.4 °F (36.9 °C)] 97.5 °F (36.4 °C)  Pulse:  [39-68] 63  Resp:  [18-23] 20  BP: (119-130)/(62-79) 130/79  SpO2:  [93 %-98 %] 98 %     Physical Exam:    General: No acute distress  Respiratory: No wheezes, no rhonchi  Cardiovascular: S1, S2, regular rate and rhythm  Abdomen: Soft, Non-tender, non-distended, positive bowel sounds  Neuro: No new focal deficits.   Extremities: No edema    Assessment & Plan:  #Syncope  #Bradycardia  Monitor on tele  Cardiology to eval  ECHO reviewed  EP to eval today  NPO currently  Not on any BB      #Possible PNA  No current symptoms  Check PCT, negative  Hold abx     #Ess HTN  Continue home meds     #Dyslipidemia  Statin     #Lypmhoma in remission     #Coccyx fracture  Pain control          5/6 Cardiology  Telemetry: nsr, sb      sodium chloride 75 mL/hr at 05/06/24 0530          Assessment:  True syncope with fall leading to coccyx fracture  Trop negative on admit  Orthostatics negative.  Symptomatic sinus bradycardia with 1st degree avb, rbbb, lafb  Echo 5/4/24-LVEF 60-65%, no rwmas.   Nuclear stress 10/2023-no  perfusion defects  HTN-controlled  On acei  HL-statin  Hx lymphoma s/p localized radiation and R CHOP therapy  DMII-A1c 7.0     Plan:  Tele with NSR/sb, no evidence of high degree avb.   Plan for PPM this afternoon. Place IV in left arm.     Tele: NSR     Exam  General- awake alert  HEENT- PEERLA  Neck- JVP normal  CV- RRR  Lungs- CTA  Extremities- no edema  Neuro- Normal  Psych- mood/affect congruent  Skin- no lesions     I have personally performed the medical decision making in its entirety. My additions include:  Plan  - Discussed recurrent syncope with trifascicular block makes blaine a higher risk than vasovagal. Did discuss that we cannot guarantee that it wasn't vagal, but with his conduction system disease and the abrupt nature of his syncope, it would be better to proceed with pacing.     Normal EF. Defer Conduction system pacing given extent of his conduction disease     Prior ports. Will need Arm IV on the left, and preferably the right as well        potassium chloride (Klor-Con M20) tab 40 mEq  Dose: 40 mEq  Freq: Every 4 hours Route: OR  Start: 05/04/24 1045 End: 05/04/24 1700   Admin Instructions:   Do not crush    1232 HG-Given   1700 HG-Given                   Medications 05/04/24 05/05/24 05/06/24   sodium chloride 0.9% infusion  Rate: 75 mL/hr  Freq: Continuous Route: IV  Start: 05/04/24 1030    1243 HG-New Bag        0236 LJ-New Bag        0530 JG-New Bag             05/06/24 1137 98.3 °F (36.8 °C) 60 20 125/78 97 % -- None (Room air) -- LS   05/06/24 0811 97.5 °F (36.4 °C) 63 20 130/79 98 % -- None (Room air) -- LS   05/06/24 0528 97.8 °F (36.6 °C) -- 20 -- -- -- None (Room air) -- NC   05/06/24 0528 -- 68 -- 119/78 97 % -- -- --    05/06/24 0009 -- 46 Abnormal  -- -- -- -- -- --    05/06/24 0008 -- 47 Abnormal  -- -- -- -- -- --    05/06/24 0007 -- 46 Abnormal  -- -- -- -- -- --    05/05/24 2330 97.8 °F (36.6 °C) 58 18 120/70 96 % -- None (Room air) -- NC   05/05/24 2249 -- 39 Abnormal   -- -- -- -- -- -- JG   05/05/24 2248 -- 41 Abnormal  -- -- -- -- -- -- JG   05/05/24 1945 98 °F (36.7 °C) 67 20 126/67 97 % -- None (Room air) -- NC   05/05/24 1558 98.3 °F (36.8 °C) 56 23 125/70 96 % -- None (Room air) --    05/05/24 1137 98.4 °F (36.9 °C) 58 18 130/62 93 % -- None (Room air) --    05/05/24 0802 97.9 °F (36.6 °C) 69 20 144/72 -- -- None (Room air) --    05/05/24 0315 97.7 °F (36.5 °C) 47 Abnormal  16 107/56 98 % -- None (Room air) -- AB   05/04/24 2345 97.8 °F (36.6 °C) 53 15 115/71 97 % -- None (Room air) -- AB   05/04/24 2015 97.7 °F (36.5 °C) 54 18 125/63 -- -- None (Room air) -- AB   05/04/24 1622 98.2 °F (36.8 °C) -- 20 -- 98 % -- None (Room air) --    05/04/24 1205 97.7 °F (36.5 °C) 59 20 144/86 96 % -- None (Room air) --    05/04/24 1047 -- -- -- -- -- 260 lb 5.8 oz -- -- HG   05/04/24 1034 -- 62 20 142/77 95 % 260 lb 5.8 oz None (Room air) --    05/04/24 1032 -- 63 18 140/81 96 % -- None (Room air) --    05/04/24 1030 97.7 °F (36.5 °C) 61 18 142/81 97 % -- None (Room air) --    05/04/24 0930 -- 57 16 139/68 98 % -- None (Room air) -- KA   05/04/24 0900 -- 63 16 144/76 96 % -- None (Room air) --    05/04/24 0830 -- 66 20 149/80 96 % -- None (Room air) --    05/04/24 0730 -- 55 14 135/83 98 % -- None (Room air) --    05/04/24 0709 -- -- -- -- -- -- None (Room air) -- MT   05/04/24 0602 97.9 °F (36.6 °C) 73 20 160/92 Abnormal  98 % 249 lb None (Room air) -- EC

## 2024-05-06 NOTE — PLAN OF CARE
Patient is aox3, vss, ra, lungs clear, SB, symptomatic when standing. Needs assistance when getting up d/t \"all of a sudden dizziness\".   EF 60-65%, Currently HR 57.  QID gluc. CGM right upper arm. nsrNSR  Pacemaker placement this afternoon with Dr Hunt. ALTHEA IV placed. Consent signed.  Pacemaker site covered with mepilex. Soft, no hematoma. A pacing. Vitals stable. Eating dinner in bed. Wife updated with procedure.  Xray in am. Ancef.    Problem: CARDIOVASCULAR - ADULT  Goal: Maintains optimal cardiac output and hemodynamic stability  Description: INTERVENTIONS:  - Monitor vital signs, rhythm, and trends  - Monitor for bleeding, hypotension and signs of decreased cardiac output  - Evaluate effectiveness of vasoactive medications to optimize hemodynamic stability  - Monitor arterial and/or venous puncture sites for bleeding and/or hematoma  - Assess quality of pulses, skin color and temperature  - Assess for signs of decreased coronary artery perfusion - ex. Angina  - Evaluate fluid balance, assess for edema, trend weights  Outcome: Progressing  Goal: Absence of cardiac arrhythmias or at baseline  Description: INTERVENTIONS:  - Continuous cardiac monitoring, monitor vital signs, obtain 12 lead EKG if indicated  - Evaluate effectiveness of antiarrhythmic and heart rate control medications as ordered  - Initiate emergency measures for life threatening arrhythmias  - Monitor electrolytes and administer replacement therapy as ordered  Outcome: Progressing

## 2024-05-06 NOTE — DISCHARGE INSTRUCTIONS
Pacemaker and Defibrillator Discharge Instructions    Activity  Plan to rest tonight and tomorrow.  Avoid drinking alcohol for next 24 hours.  Do not drive after procedure until 1-week device check appointment, unless otherwise instructed by your cardiologist.   Wear sling for next 24 hours, then only at night as needed for 30 days to help assist with arm restrictions while sleeping.     Arm Restrictions:  For 30 days after implant:  do not lift arm with implanted device above your head or behind your back. Arm is to remain below your shoulder and in front of your body.   do not lift more than 10 lbs with affected arm   do not perform repetitive cycling motion with affected arm (swimming, golfing, bowling, tennis, exercise machines, raking, vacuuming, snow shoveling).   Dr. Jarrett Only Patients: Do not perform repetitive cycling motion for 90 days total    Incision Care/Bathing  Keep incision site completely dry for 5 days after surgery. After day 5, you can remove top dressing and shower, letting clean soapy water run over incision area, patting dry.   The white steri-strips placed directly over the incision will gradually fall off over the next few weeks and can be physically removed after 3 weeks. Do not take them off before this time. (If you have a zipline dressing, this will be removed by device nurse or MD in 4 weeks)   Keep procedure site clean and dry, do not apply any ointments, creams, lotions to the incision.   Look at your incision daily to monitor for signs and symptoms of infection (redness, swelling, drainage, foul odor from procedure site)  Do not cover incision with extra dressings or gauze unless otherwise instructed.   Do not submerge incision in water, take whirlpool baths or swim for 30 days or until site is completely healed.     When to notify your physician:   If you have chest pain, shortness of breath or persistent cough  If you experience any signs of fever (temperature >101), chills,  infection (redness, swelling, thick yellow drainage, foul order from procedure site)  New or worsening swelling of arm with implanted device   If an ICD was inserted and you receive a shock and have no symptoms, call [facility] device clinic. If you have symptoms (fainting, near fainting, dizziness, lightheadedness, chest pain, shortness of breath, palpitations), call 911.    [facility] Device Clinic Direct Line: [phone]. Monday-Friday. 8:30AM-4PM.   [facility] Main Office: [phone] Monday- Friday 8AM-5PM   [facility] Main Office: [phone] Monday-Friday 8AM-5PM

## 2024-05-06 NOTE — PROGRESS NOTES
[facility]   part of [facility]     Hospitalist Progress Note     Aries Da Silva Patient Status:  Inpatient    1960 MRN [id number]   Location [facility] 8NE-A Attending Chidi Peralta MD   Hosp Day # 2 PCP PHYSICIAN NONSTAFF     Chief Complaint: syncope    Subjective:     Patient without acute events overnight. No futher dizziness. No CP or SOB. Awaiting to talk to EP    Objective:    Review of Systems:   A comprehensive review of systems was completed; pertinent positive and negatives stated in subjective.    Vital signs:  Temp:  [97.5 °F (36.4 °C)-98.4 °F (36.9 °C)] 97.5 °F (36.4 °C)  Pulse:  [39-68] 63  Resp:  [18-23] 20  BP: (119-130)/(62-79) 130/79  SpO2:  [93 %-98 %] 98 %    Physical Exam:    General: No acute distress  Respiratory: No wheezes, no rhonchi  Cardiovascular: S1, S2, regular rate and rhythm  Abdomen: Soft, Non-tender, non-distended, positive bowel sounds  Neuro: No new focal deficits.   Extremities: No edema      Diagnostic Data:    Labs:  Recent Labs   Lab 24  0546   WBC 5.2 4.4   HGB 14.8 14.4   MCV 83.7 84.2   .0 172.0       Recent Labs   Lab 24  0546   *  --  120*   BUN 20  --  14   CREATSERUM 1.09  --  0.89   CA 9.2  --  8.5   ALB 3.6  --   --      --  139   K 3.6 4.0 4.2     --  110   CO2 26.0  --  24.0   ALKPHO 56  --   --    AST 19  --   --    ALT 60  --   --    BILT 0.9  --   --    TP 6.9  --   --        Estimated Creatinine Clearance: 79.4 mL/min (based on SCr of 0.89 mg/dL).    Recent Labs   Lab 2418   TROPHS 10       No results for input(s): \"PTP\", \"INR\" in the last 168 hours.               Microbiology    No results found for this visit on 24.      Imaging: Reviewed in Epic.    Medications:    polyethylene glycol (PEG 3350)  17 g Oral Daily    aspirin  81 mg Oral Daily    lisinopril  10 mg Oral Daily    pantoprazole  20 mg Oral QAM AC    rosuvastatin  10 mg  Oral Nightly    heparin  5,000 Units Subcutaneous Q8H KRIS    insulin aspart  1-10 Units Subcutaneous TID AC and HS       Assessment & Plan:      #Syncope  #Bradycardia  Monitor on tele  Cardiology to eval  ECHO reviewed  EP to eval today  NPO currently  Not on any BB      #Possible PNA  No current symptoms  Check PCT, negative  Hold abx     #Ess HTN  Continue home meds     #Dyslipidemia  Statin     #Lypmhoma in remission     #Coccyx fracture  Pain control      Chidi Peralta MD    Supplementary Documentation:     Quality:  DVT Mechanical Prophylaxis:   SCDs,    DVT Pharmacologic Prophylaxis   Medication    heparin (Porcine) 5000 UNIT/ML injection 5,000 Units         DVT Pharmacologic prophylaxis: Aspirin 81 mg      Code Status: Not on file  Lockhart: No urinary catheter in place  Lockhart Duration (in days):   Central line:    RAMANDEEP: 5/6/2024    Discharge is dependent on: progress  At this point Mr. Da Silva is expected to be discharge to: home    The 21st Century Cures Act makes medical notes like these available to patients in the interest of transparency. Please be advised this is a medical document. Medical documents are intended to carry relevant information, facts as evident, and the clinical opinion of the practitioner. The medical note is intended as peer to peer communication and may appear blunt or direct. It is written in medical language and may contain abbreviations or verbiage that are unfamiliar.

## 2024-05-06 NOTE — PLAN OF CARE
Assumed care for pt at 19:30 on 5/5    A&Ox4. Denies pain or KRISTIN. VSS on RA. Sinus blaine on tele, lowest rate 34 during sleep. Continent of B&B. Up with standby. NS infusing. Held AM sub  q heparin. Accucheck qid.     Plan: EP to see, possible PPM placement.     Call light in reach, able to make needs known.

## 2024-05-06 NOTE — PROCEDURES
[facility]  Pacemaker Implantation Procedure Note    Aries Da Silva Patient Status:  Inpatient    1960 MRN [id number]   [facility] INTERVENTIONAL SUITES Attending Chidi Peralta MD   Hosp Day # 2 PCP PHYSICIAN NONSTAFF     OPERATION(S) PERFORMED:   1.  [employer] Dual-chamber pacemaker implant for intermittent complete heart block   2. Chest fluoroscopy.   3. Left upper extremity venography.      : Aurelio Hunt MD  INDICATION: Intermittent complete heart block  COMPLICATIONS: None      ESTIMATED BLOOD LOSS: Minimal.  SEDATION: IV was maintained by RN. Patient was assessed by myself and the nursing staff, IV sedation (versed 2 mg and [id number] mcg) were administered during continuous ECG, pulse oximeter, and non-invasive hemodynamic monitoring. I was present from the time of sedation being started to the end of the procedure ([id number]).          METHODS: The patient was brought to the EP lab in a fasting and nonsedated state after providing informed consent. IV sedation was administered during continuous ECG, pulse oximeter, and noninvasive hemodynamic monitoring. After administering 1% lidocaine for local anesthesia, an incision was made parallel to the left clavicle. The plane of the incision was extended to the prepectoral fascia. A pocket was formed.   Access to the axillary vein was achieved via the extrathoracic approach with two separate punctures.The guidewires were advanced to the IVC.     A right ventricular lead was placed in the mid RV septum   An atrial lead was positioned in the RA appendage. Local electrograms and pacing thresholds were measured. Pacing was performed at 10 v to rule out phrenic nerve stimulation.     The leads were tied to the pre-pectoral fascia with 2-0 Ethibond.    The pocket was irrigated with antibiotic solution. Bleeding was sought for until hemostasis was achieved. The leads were connected to a pulse generator. They were  coiled and placed into the pocket along with the pulse generator. The incision was closed in 3 layers using 2-0 and 3-0 Vicryl and a 4-0 subcuticular.   Steri-Strips were applied followed by a sterile dressing. The left arm was placed in a sling and the patient was transported to telemetry in stable condition. There were no apparent intraoperative complications.            CONCLUSIONS:   1. Status post successful implant of a [employer] dual-chamber permanent pacemaker for intermittent heart block and syncope   2. Adequate RA, RV pacing and sensing thresholds.   3.  PA lateral chest x-ray in the morning.  Likely discharge tomorrow         Aurelio Hunt MD  Cardiac Electrophysiology  [facility]

## 2024-05-07 VITALS
HEART RATE: 71 BPM | DIASTOLIC BLOOD PRESSURE: 79 MMHG | RESPIRATION RATE: 18 BRPM | OXYGEN SATURATION: 97 % | TEMPERATURE: 98 F | WEIGHT: 260.38 LBS | BODY MASS INDEX: 40.87 KG/M2 | SYSTOLIC BLOOD PRESSURE: 129 MMHG | HEIGHT: 67 IN

## 2024-05-07 LAB
GLUCOSE BLD-MCNC: 174 MG/DL (ref 70–99)
GLUCOSE BLD-MCNC: 90 MG/DL (ref 70–99)
GLUCOSE BLD-MCNC: 97 MG/DL (ref 70–99)

## 2024-05-07 PROCEDURE — 99239 HOSP IP/OBS DSCHRG MGMT >30: CPT | Performed by: INTERNAL MEDICINE

## 2024-05-07 RX ORDER — LISINOPRIL 10 MG/1
10 TABLET ORAL DAILY
Qty: 90 TABLET | Refills: 3 | Status: SHIPPED | OUTPATIENT
Start: 2024-05-07

## 2024-05-07 NOTE — PLAN OF CARE
Assumed care of patient @ 1930, patient resting in bed, AOX4, reports mild surgical site pain, declining prn medication at this time. Lung sounds clear, but diminished bilaterally, O2 saturation adequate on  room air. No complaints of shortness of breath or chest pain at this time. Sinus rhythm on tele, occasional pacer spikes. Bowel sounds present and active in all quadrants, last bowel movement 5/5/2024. Patient voiding without issue. CGM in place.     Plan of Care: Pacemaker recovery.     Discussed plan of care with patient, verbalized understanding. Call light within reach.     Problem: CARDIOVASCULAR - ADULT  Goal: Maintains optimal cardiac output and hemodynamic stability  Description: INTERVENTIONS:  - Monitor vital signs, rhythm, and trends  - Monitor for bleeding, hypotension and signs of decreased cardiac output  - Evaluate effectiveness of vasoactive medications to optimize hemodynamic stability  - Monitor arterial and/or venous puncture sites for bleeding and/or hematoma  - Assess quality of pulses, skin color and temperature  - Assess for signs of decreased coronary artery perfusion - ex. Angina  - Evaluate fluid balance, assess for edema, trend weights  Outcome: Progressing  Goal: Absence of cardiac arrhythmias or at baseline  Description: INTERVENTIONS:  - Continuous cardiac monitoring, monitor vital signs, obtain 12 lead EKG if indicated  - Evaluate effectiveness of antiarrhythmic and heart rate control medications as ordered  - Initiate emergency measures for life threatening arrhythmias  - Monitor electrolytes and administer replacement therapy as ordered  Outcome: Progressing     Problem: PAIN - ADULT  Goal: Verbalizes/displays adequate comfort level or patient's stated pain goal  Description: INTERVENTIONS:  - Encourage pt to monitor pain and request assistance  - Assess pain using appropriate pain scale  - Administer analgesics based on type and severity of pain and evaluate response  -  Implement non-pharmacological measures as appropriate and evaluate response  - Consider cultural and social influences on pain and pain management  - Manage/alleviate anxiety  - Utilize distraction and/or relaxation techniques  - Monitor for opioid side effects  - Notify MD/LIP if interventions unsuccessful or patient reports new pain  - Anticipate increased pain with activity and pre-medicate as appropriate  Outcome: Progressing     Problem: SAFETY ADULT - FALL  Goal: Free from fall injury  Description: INTERVENTIONS:  - Assess pt frequently for physical needs  - Identify cognitive and physical deficits and behaviors that affect risk of falls.  - [place] fall precautions as indicated by assessment.  - Educate pt/family on patient safety including physical limitations  - Instruct pt to call for assistance with activity based on assessment  - Modify environment to reduce risk of injury  - Provide assistive devices as appropriate  - Consider OT/PT consult to assist with strengthening/mobility  - Encourage toileting schedule  Outcome: Progressing     Problem: DISCHARGE PLANNING  Goal: Discharge to home or other facility with appropriate resources  Description: INTERVENTIONS:  - Identify barriers to discharge w/pt and caregiver  - Include patient/family/discharge partner in discharge planning  - Arrange for needed discharge resources and transportation as appropriate  - Identify discharge learning needs (meds, wound care, etc)  - Arrange for interpreters to assist at discharge as needed  - Consider post-discharge preferences of patient/family/discharge partner  - Complete POLST form as appropriate  - Assess patient's ability to be responsible for managing their own health  - Refer to Case Management Department for coordinating discharge planning if the patient needs post-hospital services based on physician/LIP order or complex needs related to functional status, cognitive ability or social support system  Outcome:  Progressing

## 2024-05-07 NOTE — PLAN OF CARE
Patient is aox3, vss,ra, lungs clear, diminished at the bases, Pacemaker-[employer] placed yesterday. NSR. Pacer site Mepilex intact, site around mepilex is tender but no redness. CGM right upper arm.  Chest xray neg.  When patient got out of bed this morning, felt \"hot\" denied dizziness. BS taken WNL and vss. Cards APN aware and ordered orthostatics.  Wife drove in from [place] this am.  Orthostatic bp negative. See other note. Walked 600 feet with wife and RN and tolerated well. Lunch ordered.    OK to dc per cards. Hold hydrochlorothiazide, OK to continue lisinopril.   Discharge pacemaker, meds, fu appt in [place] with EP. Wife read how to plug in bedside pacemaker monitor. No further questions. PCT took patient via wheelchair to [facility].  Problem: CARDIOVASCULAR - ADULT  Goal: Maintains optimal cardiac output and hemodynamic stability  Description: INTERVENTIONS:  - Monitor vital signs, rhythm, and trends  - Monitor for bleeding, hypotension and signs of decreased cardiac output  - Evaluate effectiveness of vasoactive medications to optimize hemodynamic stability  - Monitor arterial and/or venous puncture sites for bleeding and/or hematoma  - Assess quality of pulses, skin color and temperature  - Assess for signs of decreased coronary artery perfusion - ex. Angina  - Evaluate fluid balance, assess for edema, trend weights  Outcome: Progressing  Goal: Absence of cardiac arrhythmias or at baseline  Description: INTERVENTIONS:  - Continuous cardiac monitoring, monitor vital signs, obtain 12 lead EKG if indicated  - Evaluate effectiveness of antiarrhythmic and heart rate control medications as ordered  - Initiate emergency measures for life threatening arrhythmias  - Monitor electrolytes and administer replacement therapy as ordered  Outcome: Progressing

## 2024-05-07 NOTE — PROGRESS NOTES
05/07/24 1134 05/07/24 1137 05/07/24 1138   Vital Signs   Temp 98.2 °F (36.8 °C)  --   --    Temp src Oral  --   --    Heart Rate Source Monitor  --   --    Cardiac Rhythm NSR  --   --    Resp 18  --   --    Respiratory Quality Normal  --   --    BP Location Left arm Left arm Left arm   BP Method Automatic Automatic Automatic   Patient Position Lying Sitting Standing   Oxygen Therapy   O2 Device None (Room air) None (Room air) None (Room air)     Orthostatic negative.

## 2024-05-07 NOTE — DISCHARGE SUMMARY
[facility]     Aries Da Silva Patient Status:  Inpatient    1960 MRN [id number]   Location [facility] 8NE-A Attending Chidi Peralta MD   Hosp Day # 3 PCP PHYSICIAN NONSTAFF     Date of Admission: 2024  Date of Discharge:   2024      Discharge Disposition: Home or Self Care    Discharge Diagnosis:  Syncope  Bradycardia  Ess HTn  Dyslipidemia      History of Present Illness: Aries Da Silva is a 64 year old male with PMhx of lyphoma, HTN, DL presents with passing out and dizziness. Pt states he has had 2 episodes over the past 2 days when he felt dizzy and fell. He states his BP meds were recently adjusted. He denies any CP or SOB. No F/C. No cough. He denies any focal weakness, numbness or tingling. He denies any tongue biting or loss or urine/bowel function. He denies any history of seizures in the past. He otherwise denies any N/V/D/C or abd pain. In ED he was noted to have a few times when his HR did drop to the 30s.        Brief Synopsis: Pt was admitted and monitored on telemetry. He was seen by cardiology and underwent pacemaker placement and tolerated well.     Lace+ Score: 40  59-90 High Risk  29-58 Medium Risk  0-28   Low Risk       TCM Follow-Up Recommendation:  LACE 29-58: Moderate Risk of readmission after discharge from the hospital.      Procedures during hospitalization:   Pacemaker placement    Incidental or significant findings and recommendations (brief descriptions):  none    Lab/Test results pending at Discharge:   none    Consultants:  Cardiology    Discharge Medication List:     Discharge Medications        CHANGE how you take these medications        Instructions Prescription details   lisinopril 10 MG Tabs  Commonly known as: Zestril  What changed: Another medication with the same name was added. Make sure you understand how and when to take each.      Take 1 tablet (10 mg total) by mouth daily.   Refills: 0     lisinopril 10 MG  Tabs  Commonly known as: Zestril  What changed: You were already taking a medication with the same name, and this prescription was added. Make sure you understand how and when to take each.      Take 1 tablet (10 mg total) by mouth daily.   Quantity: 90 tablet  Refills: 3            CONTINUE taking these medications        Instructions Prescription details   aspirin 81 MG Tabs      Take 1 tablet (81 mg total) by mouth daily.   Refills: 0     dapagliflozin 5 MG Tabs  Commonly known as: Farxiga      Take 1 tablet (5 mg total) by mouth every morning.   Refills: 0     Dexcom G7  Sully      Take 1 Bottle by mouth As Directed.   Refills: 0     Dexcom G7 Sensor Misc      1 Device Every 10 days.   Refills: 0     Fish Oil Pearls 150 MG Caps      Take 1,000 mg by mouth daily.   Refills: 0     metFORMIN 500 MG Tabs  Commonly known as: Glucophage      Take 1 tablet (500 mg total) by mouth 2 (two) times daily with meals.   Refills: 0     Multi-Vitamin Tabs      Take 1 tablet by mouth daily.   Refills: 0     omeprazole 20 MG Cpdr  Commonly known as: PriLOSEC      Take 1 capsule (20 mg total) by mouth daily.   Refills: 0     rosuvastatin 10 MG Tabs  Commonly known as: Crestor      Take 1 tablet (10 mg total) by mouth nightly.   Refills: 0            STOP taking these medications      hydroCHLOROthiazide 12.5 MG Caps                  Where to Get Your Medications        Please  your prescriptions at the location directed by your doctor or nurse    Bring a paper prescription for each of these medications  lisinopril 10 MG Tabs         [place]    Follow-up appointment:   Primary Care Physician    Schedule an appointment as soon as possible for a visit in 1 week(s)  To establish with a physician with [facility], call our Physician Referral line at [phone], Monday through Friday from 7 a.m. to 6 p.m. and Saturdays from 7 a.m. to 1 p.m    Aurelio Hunt MD  [address] [place] [address]  [facility] [address]  [phone]    Schedule an appointment as soon as possible for a visit in 2 week(s)      Appointments for Next 30 Days 2024 - 2024      None            Vital signs:         -----------------------------------------------------------------------------------------------  PATIENT DISCHARGE INSTRUCTIONS: See electronic chart    Chidi Peralta MD    Total time spent on discharge plannin minutes     The  Cures Act makes medical notes like these available to patients in the interest of transparency. Please be advised this is a medical document. Medical documents are intended to carry relevant information, facts as evident, and the clinical opinion of the practitioner. The medical note is intended as peer to peer communication and may appear blunt or direct. It is written in medical language and may contain abbreviations or verbiage that are unfamiliar.

## 2024-05-07 NOTE — PROGRESS NOTES
Progress Note  Aries Da Silva Patient Status:  Inpatient    1960 MRN [id number]   Location [facility] 8NE-A Attending Chidi Peralta MD   Hosp Day # 3 PCP PHYSICIAN NONSTAFF     Subjective:  Had an episode of diaphoresis and dizziness this morning, symptoms felt similarly to those when he pass out.   Tele review showed short interval of A-paced rhythm possibly during the symptoms occurrence.   Now symptoms resolved.   Josef any CP, SOB, or palpitations at this time.        Objective:  /83 (BP Location: Right arm)   Pulse 60   Temp 98 °F (36.7 °C) (Oral)   Resp 16   Ht 5' 7\" (1.702 m)   Wt 260 lb 5.8 oz (118.1 kg)   SpO2 95%   BMI 40.78 kg/m²     Telemetry: SR, A-paced, HR 70s      Intake/Output:    Intake/Output Summary (Last 24 hours) at 2024 0732  Last data filed at 2024 0329  Gross per 24 hour   Intake 240 ml   Output 2500 ml   Net -2260 ml       Last 3 Weights   24 1047 260 lb 5.8 oz (118.1 kg)   24 1034 260 lb 5.8 oz (118.1 kg)   24 0602 249 lb (112.9 kg)   23 1719 250 lb (113.4 kg)   23 1222 250 lb (113.4 kg)   10/01/14 1305 250 lb (113.4 kg)       Labs:  Recent Labs   Lab 24  0546   *  --  120*   BUN 20  --  14   CREATSERUM 1.09  --  0.89   EGFRCR 76  --  96   CA 9.2  --  8.5     --  139   K 3.6 4.0 4.2     --  110   CO2 26.0  --  24.0     Recent Labs   Lab 24  0546   RBC 5.26 5.05   HGB 14.8 14.4   HCT 44.0 42.5   MCV 83.7 84.2   MCH 28.1 28.5   MCHC 33.6 33.9   RDW 14.0 14.2   NEPRELIM 3.53 2.91   WBC 5.2 4.4   .0 172.0         Recent Labs   Lab 24  0618   TROPHS 10       Review of Systems   Constitutional: Negative.   Cardiovascular: Negative.    Respiratory: Negative.     Neurological:  Positive for dizziness.       Physical Exam:    Gen: alert, oriented x 3, NAD  Heent: pupils equal, reactive. Mucous membranes moist.   Neck: no jvd  Cardiac:  regular rate and rhythm, normal S1,S2, no murmur, gallop or rub   Lungs: CTA  Abd: soft, NT/ND, obese +bs  Ext: no edema  Skin: Warm, diaphoretic   Neuro: No focal deficits      Medications:     polyethylene glycol (PEG 3350)  17 g Oral Daily    ceFAZolin  2 g Intravenous Q8H    aspirin  81 mg Oral Daily    lisinopril  10 mg Oral Daily    pantoprazole  20 mg Oral QAM AC    rosuvastatin  10 mg Oral Nightly    [Held by provider] heparin  5,000 Units Subcutaneous Q8H KRIS    insulin aspart  1-10 Units Subcutaneous TID AC and HS      sodium chloride 75 mL/hr at 05/06/24 0530       Assessment:  Syncope and collapse with coccyx fracture likely d/t sinus bradycardia   EKG, SR with 1st degree AV block, LVH  Trop neg   Orthostatics neg   Symptomatic sinus bradycardia with 1st degree AVB, RBBB  S/P DC [employer] PPM insertion by Dr uHnt 5/6/24  Echo 5/4/24 LVEF 60-65%, no rwma  Nuc stress test 10/2023 no perfusion defects   HTN-controlled   On lisinopril   HLP-on statin  Hx lymphoma s/p radiation and R CHOP therapy   DM2, A1C 7.0    Plan:  POD#1 S/P DC [employer] PPM placement for intermittent HB-symptomatic this morning with dizziness and diaphoresis.  Device interrogation showed normally functioning device.     CXR with stable leads placement, no pneumothorax.  Continue asa, statin, lisinopril.   Will recheck orthostatic vitals.   Tentative plan to dc this afternoon pending symptoms improvement.      Plan of care discussed with patient, RN.    ALEJANDRA Sullivan  5/7/2024  7:32 AM  [phone]      ____________________________  Pt s data reviewed and discussed with the APN.    Tele: NSR        I have personally performed the medical decision making in its entirety. My additions include:  Plan  - Device check ok  - recommend good hydration. His symptoms may be vasovagal with a blaine response that will be prevented with pacing, but the drop in BP will not be entirely eliminated with pacing.    -  discharge Home    - Recommend Dr. Noble Galindo at [facility].    R3    ____________________________  Aurelio Hunt MD, FACC, FHRS  Cardiac Electrophysiololgy  [facility] [place]  5/7/2024

## 2024-05-07 NOTE — PROGRESS NOTES
[facility]   part of [facility]     Hospitalist Progress Note     Aries Da Silva Patient Status:  Inpatient    1960 MRN [id number]   Location [facility] 8NE-A Attending Chidi Peralta MD   Hosp Day # 3 PCP PHYSICIAN NONSTAFF     Chief Complaint: syncope    Subjective:     Patient without acute events overnight. Had pacemaker placed yesterday and tolerated well. Hopes to go home.     Objective:    Review of Systems:   A comprehensive review of systems was completed; pertinent positive and negatives stated in subjective.    Vital signs:  Temp:  [97.9 °F (36.6 °C)-98.6 °F (37 °C)] 98.6 °F (37 °C)  Pulse:  [60-76] 76  Resp:  [16-20] 16  BP: (120-137)/(58-83) 137/81  SpO2:  [93 %-97 %] 95 %    Physical Exam:    General: No acute distress  Respiratory: No wheezes, no rhonchi  Cardiovascular: S1, S2, regular rate and rhythm  Abdomen: Soft, Non-tender, non-distended, positive bowel sounds  Neuro: No new focal deficits.   Extremities: No edema      Diagnostic Data:    Labs:  Recent Labs   Lab 24  0546   WBC 5.2 4.4   HGB 14.8 14.4   MCV 83.7 84.2   .0 172.0       Recent Labs   Lab 24  0546   *  --  120*   BUN 20  --  14   CREATSERUM 1.09  --  0.89   CA 9.2  --  8.5   ALB 3.6  --   --      --  139   K 3.6 4.0 4.2     --  110   CO2 26.0  --  24.0   ALKPHO 56  --   --    AST 19  --   --    ALT 60  --   --    BILT 0.9  --   --    TP 6.9  --   --        Estimated Creatinine Clearance: 79.4 mL/min (based on SCr of 0.89 mg/dL).    Recent Labs   Lab 24  0618   TROPHS 10       No results for input(s): \"PTP\", \"INR\" in the last 168 hours.               Microbiology    No results found for this visit on 05/04/24.      Imaging: Reviewed in Epic.    Medications:    polyethylene glycol (PEG 3350)  17 g Oral Daily    aspirin  81 mg Oral Daily    lisinopril  10 mg Oral Daily    pantoprazole  20 mg Oral QAM AC     rosuvastatin  10 mg Oral Nightly    [Held by provider] heparin  5,000 Units Subcutaneous Q8H KRIS    insulin aspart  1-10 Units Subcutaneous TID AC and HS       Assessment & Plan:      #Syncope  #Bradycardia  Monitor on tele  Cardiology to eval  ECHO reviewed  EP following  S/p pacemaker 5/6  Not on any BB      #Possible PNA  No current symptoms  Check PCT, negative  Hold abx     #Ess HTN  Continue home meds     #Dyslipidemia  Statin     #Lypmhoma in remission     #Coccyx fracture  Pain control      Chidi Peralta MD    Supplementary Documentation:     Quality:  DVT Mechanical Prophylaxis:   SCDs,    DVT Pharmacologic Prophylaxis   Medication    [Held by provider] heparin (Porcine) 5000 UNIT/ML injection 5,000 Units         DVT Pharmacologic prophylaxis: Aspirin 81 mg      Code Status: Not on file  Lockhart: No urinary catheter in place  Lockhart Duration (in days):   Central line:    RAMANDEEP: 5/8/2024    Discharge is dependent on: progress  At this point Mr. Da Silva is expected to be discharge to: home    The 21st Century Cures Act makes medical notes like these available to patients in the interest of transparency. Please be advised this is a medical document. Medical documents are intended to carry relevant information, facts as evident, and the clinical opinion of the practitioner. The medical note is intended as peer to peer communication and may appear blunt or direct. It is written in medical language and may contain abbreviations or verbiage that are unfamiliar.

## 2024-05-07 NOTE — PAYOR COMM NOTE
--------------  CONTINUED STAY REVIEW  5/6  Payor: [facility] OUT OF STATE HMO  Subscriber #:  [id number]  Authorization Number: [id number]    Admit date: 5/4/24  Admit time: 10:12 AM    REVIEW DOCUMENTATION:  5/6 Pacemaker Implantation Procedure Note       OPERATION(S) PERFORMED:   1.  [employer] Dual-chamber pacemaker implant for intermittent complete heart block   2. Chest fluoroscopy.   3. Left upper extremity venography.      : Aurelio Hunt MD  INDICATION: Intermittent complete heart block  COMPLICATIONS: None      ESTIMATED BLOOD LOSS: Minimal.  SEDATION: IV was maintained by RN. Patient was assessed by myself and the nursing staff, IV sedation (versed 2 mg and [id number] mcg) were administered during continuous ECG, pulse oximeter, and non-invasive hemodynamic monitoring. I was present from the time of sedation being started to the end of the procedure (0236-9130).          METHODS: The patient was brought to the EP lab in a fasting and nonsedated state after providing informed consent. IV sedation was administered during continuous ECG, pulse oximeter, and noninvasive hemodynamic monitoring. After administering 1% lidocaine for local anesthesia, an incision was made parallel to the left clavicle. The plane of the incision was extended to the prepectoral fascia. A pocket was formed.   Access to the axillary vein was achieved via the extrathoracic approach with two separate punctures.The guidewires were advanced to the IVC.      A right ventricular lead was placed in the mid RV septum   An atrial lead was positioned in the RA appendage. Local electrograms and pacing thresholds were measured. Pacing was performed at 10 v to rule out phrenic nerve stimulation.      The leads were tied to the pre-pectoral fascia with 2-0 Ethibond.     The pocket was irrigated with antibiotic solution. Bleeding was sought for until hemostasis was achieved. The leads were connected to a pulse generator. They were  coiled and placed into the pocket along with the pulse generator. The incision was closed in 3 layers using 2-0 and 3-0 Vicryl and a 4-0 subcuticular.   Steri-Strips were applied followed by a sterile dressing. The left arm was placed in a sling and the patient was transported to telemetry in stable condition. There were no apparent intraoperative complications.            CONCLUSIONS:   1. Status post successful implant of a [employer] dual-chamber permanent pacemaker for intermittent heart block and syncope   2. Adequate RA, RV pacing and sensing thresholds.   3.  PA lateral chest x-ray in the morning.  Likely discharge tomorrow          MEDICATIONS ADMINISTERED IN LAST 1 DAY:  acetaminophen (Tylenol Extra Strength) tab 500 mg       Date Action Dose Route User    5/7/2024 0807 Given 500 mg Oral Yu Floyd RN    5/6/2024 2248 Given 500 mg Oral Anisa Santos RN          aspirin DR tab 81 mg       Date Action Dose Route User    5/7/2024 0807 Given 81 mg Oral Yu Floyd RN          ceFAZolin (Ancef) 2 g/20mL IV syringe premix       Date Action Dose Route User    5/7/2024 0807 Given 2 g Intravenous Yu Floyd RN          ceFAZolin (Ancef) 2 g in 20mL IV syringe premix       Date Action Dose Route User    5/7/2024 0807 Given 2 g Intravenous Yu Floyd RN    5/7/2024 0153 Given 2 g Intravenous Anisa Santos RN          lisinopril (Zestril) tab 10 mg       Date Action Dose Route User    5/7/2024 0807 Given 10 mg Oral Yu Floyd RN          pantoprazole (Protonix) DR tab 20 mg       Date Action Dose Route User    5/7/2024 0547 Given 20 mg Oral Anisa Santos RN          rosuvastatin (Crestor) tab 10 mg       Date Action Dose Route User    5/6/2024 2111 Given 10 mg Oral Anisa Santos RN            Vitals (last day)       Date/Time Temp Pulse Resp BP SpO2 Weight O2 Device O2 Flow Rate (L/min) [facility]    05/07/24 1138 -- -- -- -- -- -- None (Room air) --     05/07/24 1138 97.9 °F (36.6 °C) 71 18  129/79 97 % -- -- -- ML    05/07/24 1137 -- -- -- -- -- -- None (Room air) -- AW    05/07/24 1134 98.2 °F (36.8 °C) -- 18 -- -- -- None (Room air) -- AW    05/07/24 1030 -- 85 -- -- -- -- -- -- ML    05/07/24 0739 98.6 °F (37 °C) 76 16 137/81 95 % -- None (Room air) -- ML    05/07/24 0700 -- 67 -- -- 96 % -- -- -- AW    05/07/24 0329 -- 60 -- -- 95 % -- -- -- RS    05/07/24 0307 98 °F (36.7 °C) 60 16 126/83 97 % -- -- -- RS    05/06/24 2301 98 °F (36.7 °C) 60 18 123/74 95 % -- -- -- RS    05/06/24 1959 97.9 °F (36.6 °C) 72 16 120/58 96 % -- -- -- RS    05/06/24 1628 98.6 °F (37 °C) 63 18 128/79 93 % -- None (Room air) -- LS    05/06/24 1137 98.3 °F (36.8 °C) 60 20 125/78 97 % -- None (Room air) -- LS    05/06/24 0811 97.5 °F (36.4 °C) 63 20 130/79 98 % -- None (Room air) -- LS    05/06/24 0528 97.8 °F (36.6 °C) -- 20 -- -- -- None (Room air) -- NC    05/06/24 0528 -- 68 -- 119/78 97 % -- -- --     05/06/24 0009 -- 46 -- -- -- -- -- --     05/06/24 0008 -- 47 -- -- -- -- -- --     05/06/24 0007 -- 46 -- -- -- -- -- --

## 2024-05-08 NOTE — PAYOR COMM NOTE
--------------  DISCHARGE REVIEW    Payor: [employer] OUT OF STATE HMO  Subscriber #:  [id number]  Authorization Number: [id number]    Admit date: 5/4/24  Admit time:  10:12 AM  Discharge Date: 5/7/2024  4:02 PM     Admitting Physician: Chidi Peralta MD  Attending Physician:  No att. providers found  Primary Care Physician: Nonstaff, Physician  .         REVIEWER COMMENTS  Patient without acute events overnight. Had pacemaker placed yesterday and tolerated well. Hopes to go home.            Objective:  Review of Systems:   A comprehensive review of systems was completed; pertinent positive and negatives stated in subjective.     Vital signs:  Temp:  [97.9 °F (36.6 °C)-98.6 °F (37 °C)] 98.6 °F (37 °C)  Pulse:  [60-76] 76  Resp:  [16-20] 16  BP: (120-137)/(58-83) 137/81  SpO2:  [93 %-97 %] 95 %     Physical Exam:    General: No acute distress  Respiratory: No wheezes, no rhonchi  Cardiovascular: S1, S2, regular rate and rhythm  Abdomen: Soft, Non-tender, non-distended, positive bowel sounds  Neuro: No new focal deficits.   Extremities: No edema    Assessment & Plan:  #Syncope  #Bradycardia  Monitor on tele  Cardiology to eval  ECHO reviewed  EP following  S/p pacemaker 5/6  Not on any BB      #Possible PNA  No current symptoms  Check PCT, negative  Hold abx     #Ess HTN  Continue home meds     #Dyslipidemia  Statin     #Lypmhoma in remission     #Coccyx fracture  Pain control

## (undated) NOTE — LETTER
[address]  [address]  Consent for Procedure/Sedation  Date: 5/6/24         Time: 1130    I hereby authorize Dr Hunt, my physician and his/her assistants (if applicable), which may include medical students, residents, and/or fellows, to perform the following surgical operation/ procedure and administer such anesthesia as may be determined necessary by my physician: Pacemaker Placement on Aries Da Silva  2.   I recognize that during the surgical operation/procedure, unforeseen conditions may necessitate additional or different procedures than those listed above.  I, therefore, further authorize and request that the above-named surgeon, assistants, or designees perform such procedures as are, in their judgment, necessary and desirable.    3.   My surgeon/physician has discussed prior to my surgery the potential benefits, risks and side effects of this procedure; the likelihood of achieving goals; and potential problems that might occur during recuperation.  They also discussed reasonable alternatives to the procedure, including risks, benefits, and side effects related to the alternatives and risks related to not receiving this procedure.  I have had all my questions answered and I acknowledge that no guarantee has been made as to the result that may be obtained.    4.   Should the need arise during my operation/procedure, which includes change of level of care prior to discharge, I also consent to the administration of blood and/or blood products.  Further, I understand that despite careful testing and screening of blood or blood products by collecting agencies, I may still be subject to ill effects as a result of receiving a blood transfusion and/or blood products.  The following are some, but not all, of the potential risks that can occur: fever and allergic reactions, hemolytic reactions, transmission of diseases such as Hepatitis, AIDS and Cytomegalovirus (CMV) and  fluid overload.  In the event that I wish to have an autologous transfusion of my own blood, or a directed donor transfusion, I will discuss this with my physician.   Check only if Refusing Blood or Blood Products  I understand refusal of blood or blood products as deemed necessary by my physician may have serious consequences to my condition to include possible death. I hereby assume responsibility for my refusal and release the hospital, its personnel, and my physicians from any responsibility for the consequences of my refusal.             Refuse         5.   I authorize the use of any specimen, organs, tissues, body parts or foreign objects that may be removed from my body during the operation/procedure for diagnosis, research or teaching purposes and their subsequent disposal by hospital authorities.  I also authorize the release of specimen test results and/or written reports to my treating physician on the hospital medical staff or other referring or consulting physicians involved in my care, at the discretion of the Pathologist or my treating physician.    6.   I consent to the photographing or videotaping of the operations or procedures to be performed, including appropriate portions of my body for medical, scientific, or educational purposes, provided my identity is not revealed by the pictures or by descriptive texts accompanying them.  If the procedure has been photographed/videotaped, the surgeon will obtain the original picture, image, videotape or CD.  The hospital will not be responsible for storage, release or maintenance of the picture, image, tape or CD.    7.   I consent to the presence of a  or observers in the operating room as deemed necessary by my physician or their designees.    8.   I recognize that in the event my procedure results in extended X-Ray/fluoroscopy time, I may develop a skin reaction.    9. If I have a Do Not Attempt Resuscitation (DNAR) order in place, that  status will be suspended while in the operating room, procedural suite, and during the recovery period unless otherwise explicitly stated by me (or a person authorized to consent on my behalf). The surgeon or my attending physician will determine when the applicable recovery period ends for purposes of reinstating the DNAR order.  10. Patients having a sterilization procedure: I understand that if the procedure is successful the results will be permanent and it will therefore be impossible for me to inseminate, conceive, or bear children.  I also understand that the procedure is intended to result in sterility, although the result has not been guaranteed.   11. I acknowledge that my physician has explained sedation/analgesia administration to me including the risk and benefits I consent to the administration of sedation/analgesia as may be necessary or desirable in the judgment of my physician.    I CERTIFY THAT I HAVE READ AND FULLY UNDERSTAND THE ABOVE CONSENT TO OPERATION and/or OTHER PROCEDURE.        ____________________________________       _________________________________      ______________________________  Signature of Patient         Signature of Responsible Person        Printed Name of Responsible Person        ____________________________________      _________________________________      ______________________________       Signature of Witness          Relationship to Patient                       Date                                       Time  Patient Name: Aries Borges Avinash  : 1960    Reviewed: 2024   Printed: May 6, 2024  Medical Record #: [id number] Page 1 of 1